# Patient Record
Sex: FEMALE | Race: ASIAN | Employment: UNEMPLOYED | URBAN - METROPOLITAN AREA
[De-identification: names, ages, dates, MRNs, and addresses within clinical notes are randomized per-mention and may not be internally consistent; named-entity substitution may affect disease eponyms.]

---

## 2018-01-11 ENCOUNTER — HOSPITAL ENCOUNTER (OUTPATIENT)
Dept: MAMMOGRAPHY | Age: 58
Discharge: HOME | End: 2018-01-11
Attending: FAMILY MEDICINE
Payer: OTHER GOVERNMENT

## 2018-01-11 DIAGNOSIS — Z12.39 SCREENING FOR MALIGNANT NEOPLASM OF BREAST: ICD-10-CM

## 2018-01-11 PROCEDURE — 77067 SCR MAMMO BI INCL CAD: CPT

## 2019-05-21 RX ORDER — MIDAZOLAM HYDROCHLORIDE 1 MG/ML
1-5 INJECTION, SOLUTION INTRAMUSCULAR; INTRAVENOUS
Status: CANCELLED | OUTPATIENT
Start: 2019-05-21

## 2019-05-21 RX ORDER — FENTANYL CITRATE 50 UG/ML
50 INJECTION, SOLUTION INTRAMUSCULAR; INTRAVENOUS
Status: CANCELLED | OUTPATIENT
Start: 2019-05-21

## 2019-06-04 ENCOUNTER — HOSPITAL ENCOUNTER (OUTPATIENT)
Age: 59
Setting detail: OUTPATIENT SURGERY
Discharge: HOME | End: 2019-06-04
Attending: SURGERY | Admitting: SURGERY
Payer: OTHER GOVERNMENT

## 2019-06-04 VITALS
SYSTOLIC BLOOD PRESSURE: 130 MMHG | BODY MASS INDEX: 28.02 KG/M2 | HEART RATE: 67 BPM | OXYGEN SATURATION: 94 % | RESPIRATION RATE: 16 BRPM | DIASTOLIC BLOOD PRESSURE: 87 MMHG | TEMPERATURE: 98.2 F | WEIGHT: 139 LBS | HEIGHT: 59 IN

## 2019-06-04 PROCEDURE — 76040000026: Performed by: SURGERY

## 2019-06-04 PROCEDURE — 88305 TISSUE EXAM BY PATHOLOGIST: CPT

## 2019-06-04 PROCEDURE — G0500 MOD SEDAT ENDO SERVICE >5YRS: HCPCS | Performed by: SURGERY

## 2019-06-04 PROCEDURE — 77030021593 HC FCPS BIOP ENDOSC BSC -A: Performed by: SURGERY

## 2019-06-04 PROCEDURE — 74011250636 HC RX REV CODE- 250/636: Performed by: SURGERY

## 2019-06-04 PROCEDURE — 74011250636 HC RX REV CODE- 250/636

## 2019-06-04 PROCEDURE — 99153 MOD SED SAME PHYS/QHP EA: CPT | Performed by: SURGERY

## 2019-06-04 RX ORDER — FENTANYL CITRATE 50 UG/ML
50 INJECTION, SOLUTION INTRAMUSCULAR; INTRAVENOUS
Status: DISCONTINUED | OUTPATIENT
Start: 2019-06-04 | End: 2019-06-04 | Stop reason: HOSPADM

## 2019-06-04 RX ORDER — MIDAZOLAM HYDROCHLORIDE 1 MG/ML
1-5 INJECTION, SOLUTION INTRAMUSCULAR; INTRAVENOUS
Status: DISCONTINUED | OUTPATIENT
Start: 2019-06-04 | End: 2019-06-04 | Stop reason: HOSPADM

## 2019-06-04 RX ORDER — SODIUM CHLORIDE 9 MG/ML
1000 INJECTION, SOLUTION INTRAVENOUS CONTINUOUS
Status: DISCONTINUED | OUTPATIENT
Start: 2019-06-04 | End: 2019-06-04 | Stop reason: HOSPADM

## 2019-06-04 RX ADMIN — FENTANYL CITRATE 50 MCG: 50 INJECTION, SOLUTION INTRAMUSCULAR; INTRAVENOUS at 09:23

## 2019-06-04 RX ADMIN — FENTANYL CITRATE 50 MCG: 50 INJECTION, SOLUTION INTRAMUSCULAR; INTRAVENOUS at 09:36

## 2019-06-04 RX ADMIN — SODIUM CHLORIDE 1000 ML: 900 INJECTION, SOLUTION INTRAVENOUS at 08:08

## 2019-06-04 RX ADMIN — MIDAZOLAM HYDROCHLORIDE 2 MG: 1 INJECTION, SOLUTION INTRAMUSCULAR; INTRAVENOUS at 09:23

## 2019-06-04 RX ADMIN — MIDAZOLAM HYDROCHLORIDE 1 MG: 1 INJECTION, SOLUTION INTRAMUSCULAR; INTRAVENOUS at 09:36

## 2019-06-04 NOTE — OP NOTES
[address] [facility]  OPERATIVE REPORT    Name:  Roseanne Bates  MR#:  [id number]  :  1960  ACCOUNT #:  [de-identified]  DATE OF SERVICE:  2019    PREOPERATIVE DIAGNOSIS:  History of colon polyp with rectal bleeding. POSTOPERATIVE DIAGNOSES:  Colon polyp and very extensive internal and external hemorrhoids and moderate diverticulosis. PROCEDURES PERFORMED:  Colonoscopy with polypectomy with cold forceps. SURGEON:  Arden Flowers MD    ANESTHESIA:  Conscious sedation    COMPLICATIONS: none    SPECIMENS REMOVED:  polyp    IMPLANTS:  none    ESTIMATED BLOOD LOSS:  none    INDICATION:  This is a 61-year-old female who presented with a long history of rectal bleeding. She also reports a history of hemorrhoids and then repeat colonoscopy was required. Patient understood risks and benefits and agreed to proceed. FINDINGS:  1. She does have very extensive circumferential internal and external hemorrhoids and there is one raw area appeared to be responsible for bleeding. 2.  She has one 3 mm polyp at 35 cm from anal verge. This was removed with cold forceps. 3.  She has moderate diverticulosis and she has very redundant colon with moderate liquidy stool which could hide more small polyps. PROCEDURE:  After informed consent was obtained, patient was brought into the GI suite. Conscious sedation with fentanyl and Versed were used and then patient was left on left decubitus position. The hemorrhoids were easily visible from outside. There was also clearly a bleeding spot from the hemorrhoid site. Then, the regular colonoscope was used and this was advanced carefully under direct vision. She does have very redundant colon. With careful manipulations and repositioning, scope was able to intubate the cecum. Ileocecal valve and appendiceal orifice were clearly identified.   The scope was then carefully withdrawn and the right transverse descending colon looks very unremarkable and a small polyp in the sigmoid colon was removed completely with biopsy forceps. Then, the scope was further withdrawn and there were no other obvious bigger polyp or tumor, although her bowel prep was not perfect. Then, the scope was withdrawn. Patient tolerated the procedure well and transferred to recovery room in stable condition. RECOMMENDATION:  I will see her in two to three weeks to discuss the biopsy results and also she should have hemorrhoid surgery given her ongoing bleeding.       Nora Cabral MD      [email or web address]  D:  06/04/2019 9:59  T:  06/04/2019 11:29  JOB #:  [id number]

## 2019-06-04 NOTE — DISCHARGE INSTRUCTIONS
Gastrointestinal Colonoscopy/Flexible Sigmoidoscopy - Lower Exam Discharge Instructions  1. Call Dr. Racquel Raya for any problems or questions. 2. Contact the doctors office for follow up appointment as directed  3. Medication may cause drowsiness for several hours, therefore:  · Do not drive or operate machinery for reminder of the day. · No alcohol today. · Do not make any important or legal decisions for 24 hours. · Do not sign any legal documents for 24 hours. 4. Ordinarily, you may resume regular diet and activity after exam unless otherwise specified by your physician. 5. Because of air put into your colon during exam, you may experience some abdominal distension, relieved by the passage of gas, for several hours. 6. Contact your physician if you have any of the following:  · Excessive amount of bleeding - large amount when having a bowel movement. Occasional specks of blood with bowel movement would not be unusual.  · Severe abdominal pain  · Fever or Chills  7. Polyp Removal - follow these additional instructions  · No Aspirin, Advil, Aleve, Nuprin, Ibuprofen, or medications that contain these drugs for 2 weeks. Any additional instructions:  Dr. Rema Mckinney office will contact you regarding the results of the pathology. Instructions given to Cheo Perez and other family members.

## 2019-06-04 NOTE — H&P
[facility]  [address] [place] [address]  [phone]      Patient:  Jose Hernandez  : 1960     [facility]  Jose Hernandez is a 63 y.o. female Patient presents to the office for colonoscopy. Patient states she had a colonoscopy 5 years ago at [facility] and was told she had 3 polyps and had to go back to completely excised. She was told to repeat scope in 3 years. . Patient states bowel habits are good, denies diarrhea or constipation. She does states she has rectal bleeding on and off and has since last colonoscopy. Bright red in color with and sometimes without BM. States she does have history of hemorrhoids. Good appetite, no unintentional weight loss noted. Denies N/V. Denies CP or SOB. does not have a history of smoking. does not have a history of drinking alcohol.     does not have a family history of colon cancer. Medical history- HTN. Abdominal surgeries-None  does not take blood thinners                Past Medical History:   Diagnosis Date    Cancer ([facility] [address]       rectal carcinoid    Hypertension               Current Outpatient Medications   Medication Sig Dispense Refill    losartan-hydroCHLOROthiazide (HYZAAR) 50-12.5 mg per tablet TAKE 1 TABLET DAILY FOR HYPERTENSION 90 Tab 3    verapamil ER (VERELAN) 240 mg ER capsule TAKE 1 CAPSULE DAILY FOR HYPERTENSION 90 Cap 3           Allergies   Allergen Reactions    Aspirin Rash            Past Surgical History:   Procedure Laterality Date    HX COLONOSCOPY                Family History   Problem Relation Age of Onset    Other Father           Cardiac Arrest    Other Mother           Heart Problems    Breast Cancer Neg Hx        Social History           Tobacco Use    Smoking status: Never Smoker    Smokeless tobacco: Never Used   Substance Use Topics    Alcohol use: No         Review of Systems   A comprehensive review of systems was negative except for that written in the HPI.      Physical Exam  Visit Vitals  /80   Pulse 90   Ht 4' 11\" (1.499 m)   Wt 139 lb (63 kg)   SpO2 97%   BMI 28.07 kg/m²         General:          Alert, oriented, cooperative, awake patient in no acute distress   Skin:                Warm, moist with good texture   Eyes:               Sclera are clear, extraocular muscles intact  HENT:             Normocephalic; oral mucosa moist, nares patent; neck is supple; trachea midline  Respiratory:     Lungs clear to auscultation bilaterally, breathing is non-labored   Chest:              Symmetric throughout one respiratory excursion; no supraclavicular lymphadenopathy  CV:                  Regular rate and rhythm, no appreciable murmurs, rubs, gallops  Abdomen:        Soft, protuberant but non-distended; bowel sounds are normoactive   Extremities:     No cyanosis, clubbing or edema  Neurological:   No focal signs        Labs:         Lab Results   Component Value Date/Time     WBC (POC) 7.7 04/03/2019 11:00 AM     HGB (POC) 13.8 04/03/2019 11:00 AM     HCT (POC) 40.8 04/03/2019 11:00 AM     PLATELET (POC) 593 07/92/2173 11:00 AM     MCV (POC) 93.1 04/03/2019 11:00 AM         Assessment/Plan:   Juli Bravo is a 63 y.o. female who has signs and symptoms consistent with need for colonoscopy. She had previous one 5 years ago with polyps and was told to return in 3 years. She is here for her repeat colonoscopy. Patient verbalized understanding of importance for bowel prep and understands she will need a ride after procedure related to sedation.     1. Schedule colonoscopy     Jodie Mohan NP     I have seen and examined the patient with the Nurse Practitioner and agree with the above assessment and plan.      History of colon polyps, chronic bright red rectal bleeding with history of hemorrhoids.  Need colonoscopy.   Magda Pal MD

## 2019-06-04 NOTE — ROUTINE PROCESS
Pt. Discharged to car by Ana Barun with family. Vital signs stable. Able to tolerate PO fluids. Passing gas.  Seen by MD.

## 2019-08-23 PROBLEM — K64.1 GRADE II HEMORRHOIDS: Status: ACTIVE | Noted: 2019-08-23

## 2019-09-06 RX ORDER — SODIUM CHLORIDE 0.9 % (FLUSH) 0.9 %
5-40 SYRINGE (ML) INJECTION AS NEEDED
Status: CANCELLED | OUTPATIENT
Start: 2019-09-06

## 2019-09-06 RX ORDER — SODIUM CHLORIDE 0.9 % (FLUSH) 0.9 %
5-40 SYRINGE (ML) INJECTION EVERY 8 HOURS
Status: CANCELLED | OUTPATIENT
Start: 2019-09-06

## 2019-09-12 ENCOUNTER — HOSPITAL ENCOUNTER (OUTPATIENT)
Dept: SURGERY | Age: 59
Discharge: HOME | End: 2019-09-12
Payer: OTHER GOVERNMENT

## 2019-09-12 VITALS
HEART RATE: 66 BPM | RESPIRATION RATE: 18 BRPM | OXYGEN SATURATION: 97 % | BODY MASS INDEX: 26.14 KG/M2 | HEIGHT: 61 IN | DIASTOLIC BLOOD PRESSURE: 84 MMHG | WEIGHT: 138.44 LBS | TEMPERATURE: 98.1 F | SYSTOLIC BLOOD PRESSURE: 173 MMHG

## 2019-09-12 LAB
ALBUMIN SERPL-MCNC: 4.4 G/DL (ref 3.5–5)
ALBUMIN/GLOB SERPL: 1 {RATIO} (ref 1.2–3.5)
ALP SERPL-CCNC: 80 U/L (ref 50–136)
ALT SERPL-CCNC: 27 U/L (ref 12–65)
ANION GAP SERPL CALC-SCNC: 5 MMOL/L (ref 7–16)
AST SERPL-CCNC: 15 U/L (ref 15–37)
BASOPHILS # BLD: 0 K/UL (ref 0–0.2)
BASOPHILS NFR BLD: 0 % (ref 0–2)
BILIRUB SERPL-MCNC: 0.6 MG/DL (ref 0.2–1.1)
BUN SERPL-MCNC: 7 MG/DL (ref 6–23)
CALCIUM SERPL-MCNC: 9.3 MG/DL (ref 8.3–10.4)
CHLORIDE SERPL-SCNC: 106 MMOL/L (ref 98–107)
CO2 SERPL-SCNC: 31 MMOL/L (ref 21–32)
CREAT SERPL-MCNC: 0.62 MG/DL (ref 0.6–1)
DIFFERENTIAL METHOD BLD: NORMAL
EOSINOPHIL # BLD: 0.3 K/UL (ref 0–0.8)
EOSINOPHIL NFR BLD: 4 % (ref 0.5–7.8)
ERYTHROCYTE [DISTWIDTH] IN BLOOD BY AUTOMATED COUNT: 12.2 % (ref 11.9–14.6)
GLOBULIN SER CALC-MCNC: 4.5 G/DL (ref 2.3–3.5)
GLUCOSE SERPL-MCNC: 104 MG/DL (ref 65–100)
HCT VFR BLD AUTO: 41.9 % (ref 35.8–46.3)
HGB BLD-MCNC: 14.5 G/DL (ref 11.7–15.4)
IMM GRANULOCYTES # BLD AUTO: 0 K/UL (ref 0–0.5)
IMM GRANULOCYTES NFR BLD AUTO: 0 % (ref 0–5)
INR PPP: 1
LYMPHOCYTES # BLD: 2.2 K/UL (ref 0.5–4.6)
LYMPHOCYTES NFR BLD: 31 % (ref 13–44)
MCH RBC QN AUTO: 31.5 PG (ref 26.1–32.9)
MCHC RBC AUTO-ENTMCNC: 34.6 G/DL (ref 31.4–35)
MCV RBC AUTO: 91.1 FL (ref 79.6–97.8)
MONOCYTES # BLD: 0.4 K/UL (ref 0.1–1.3)
MONOCYTES NFR BLD: 6 % (ref 4–12)
NEUTS SEG # BLD: 4 K/UL (ref 1.7–8.2)
NEUTS SEG NFR BLD: 58 % (ref 43–78)
NRBC # BLD: 0 K/UL (ref 0–0.2)
PLATELET # BLD AUTO: 286 K/UL (ref 150–450)
PMV BLD AUTO: 9.8 FL (ref 9.4–12.3)
POTASSIUM SERPL-SCNC: 3.2 MMOL/L (ref 3.5–5.1)
PROT SERPL-MCNC: 8.9 G/DL (ref 6.3–8.2)
PROTHROMBIN TIME: 13.7 SEC (ref 11.7–14.5)
RBC # BLD AUTO: 4.6 M/UL (ref 4.05–5.2)
SODIUM SERPL-SCNC: 142 MMOL/L (ref 136–145)
WBC # BLD AUTO: 6.9 K/UL (ref 4.3–11.1)

## 2019-09-12 PROCEDURE — 85610 PROTHROMBIN TIME: CPT

## 2019-09-12 PROCEDURE — 85025 COMPLETE CBC W/AUTO DIFF WBC: CPT

## 2019-09-12 PROCEDURE — 80053 COMPREHEN METABOLIC PANEL: CPT

## 2019-09-12 RX ORDER — VERAPAMIL HYDROCHLORIDE 240 MG/1
240 CAPSULE, EXTENDED RELEASE ORAL
COMMUNITY
End: 2019-10-31 | Stop reason: SDUPTHER

## 2019-09-12 NOTE — PERIOP NOTES
Lab resulted and routed to surgeon's office. Results wnl of anesthesia protocol. Recent Results (from the past 12 hour(s))   CBC WITH AUTOMATED DIFF    Collection Time: 09/12/19 10:50 AM   Result Value Ref Range    WBC 6.9 4.3 - 11.1 K/uL    RBC 4.60 4.05 - 5.2 M/uL    HGB 14.5 11.7 - 15.4 g/dL    HCT 41.9 35.8 - 46.3 %    MCV 91.1 79.6 - 97.8 FL    MCH 31.5 26.1 - 32.9 PG    MCHC 34.6 31.4 - 35.0 g/dL    RDW 12.2 11.9 - 14.6 %    PLATELET 620 884 - 691 K/uL    MPV 9.8 9.4 - 12.3 FL    ABSOLUTE NRBC 0.00 0.0 - 0.2 K/uL    DF AUTOMATED      NEUTROPHILS 58 43 - 78 %    LYMPHOCYTES 31 13 - 44 %    MONOCYTES 6 4.0 - 12.0 %    EOSINOPHILS 4 0.5 - 7.8 %    BASOPHILS 0 0.0 - 2.0 %    IMMATURE GRANULOCYTES 0 0.0 - 5.0 %    ABS. NEUTROPHILS 4.0 1.7 - 8.2 K/UL    ABS. LYMPHOCYTES 2.2 0.5 - 4.6 K/UL    ABS. MONOCYTES 0.4 0.1 - 1.3 K/UL    ABS. EOSINOPHILS 0.3 0.0 - 0.8 K/UL    ABS. BASOPHILS 0.0 0.0 - 0.2 K/UL    ABS. IMM. GRANS. 0.0 0.0 - 0.5 K/UL   METABOLIC PANEL, COMPREHENSIVE    Collection Time: 09/12/19 10:50 AM   Result Value Ref Range    Sodium 142 136 - 145 mmol/L    Potassium 3.2 (L) 3.5 - 5.1 mmol/L    Chloride 106 98 - 107 mmol/L    CO2 31 21 - 32 mmol/L    Anion gap 5 (L) 7 - 16 mmol/L    Glucose 104 (H) 65 - 100 mg/dL    BUN 7 6 - 23 MG/DL    Creatinine 0.62 0.6 - 1.0 MG/DL    GFR est AA >60 >60 ml/min/1.73m2    GFR est non-AA >60 >60 ml/min/1.73m2    Calcium 9.3 8.3 - 10.4 MG/DL    Bilirubin, total 0.6 0.2 - 1.1 MG/DL    ALT (SGPT) 27 12 - 65 U/L    AST (SGOT) 15 15 - 37 U/L    Alk.  phosphatase 80 50 - 136 U/L    Protein, total 8.9 (H) 6.3 - 8.2 g/dL    Albumin 4.4 3.5 - 5.0 g/dL    Globulin 4.5 (H) 2.3 - 3.5 g/dL    A-G Ratio 1.0 (L) 1.2 - 3.5     PROTHROMBIN TIME + INR    Collection Time: 09/12/19 10:50 AM   Result Value Ref Range    Prothrombin time 13.7 11.7 - 14.5 sec    INR 1.0

## 2019-09-12 NOTE — PERIOP NOTES
Patient verified name and     Order for consent was found in EHR and matches case posting; patient verified. Type 1B surgery, walk in assessment complete. Labs per surgeon: cbc,cmp, pt collected and results in [facility];   Labs per anesthesia protocol:no additional  EKG: EKG is not required per protocol. Patient denies any chest pain or shortness of breath on exertion    Hospital approved surgical skin cleanser and instructions given per hospital policy. Patient provided with and instructed on educational handouts including Guide to Surgery, Pain Management, Hand Hygiene, Blood Transfusion Education, and [facility] Brochure. Patient answered medical/surgical history questions at their best of ability. All prior to admission medications documented in [facility]. Original medication prescription bottle were not visualized during patient appointment. Patient instructed to hold all vitamins 7 days prior to surgery and NSAIDS 5 days prior to surgery, patient verbalized understanding. Prescription medications to hold: Losartan/ HCTZ day of surgery    Patient instructed to continue previous medications as prescribed prior to surgery and to take the following medications the day of surgery according to anesthesia guidelines with a small sip of water: none. Patient teach back successful and patient demonstrates knowledge of instructions.

## 2019-09-19 ENCOUNTER — ANESTHESIA (OUTPATIENT)
Dept: SURGERY | Age: 59
End: 2019-09-19
Payer: OTHER GOVERNMENT

## 2019-09-19 ENCOUNTER — HOSPITAL ENCOUNTER (OUTPATIENT)
Age: 59
Setting detail: OUTPATIENT SURGERY
Discharge: HOME | End: 2019-09-19
Attending: SURGERY | Admitting: SURGERY
Payer: OTHER GOVERNMENT

## 2019-09-19 ENCOUNTER — ANESTHESIA EVENT (OUTPATIENT)
Dept: SURGERY | Age: 59
End: 2019-09-19
Payer: OTHER GOVERNMENT

## 2019-09-19 VITALS
DIASTOLIC BLOOD PRESSURE: 81 MMHG | RESPIRATION RATE: 16 BRPM | HEART RATE: 79 BPM | BODY MASS INDEX: 25.79 KG/M2 | SYSTOLIC BLOOD PRESSURE: 146 MMHG | OXYGEN SATURATION: 94 % | TEMPERATURE: 97.9 F | WEIGHT: 136.5 LBS

## 2019-09-19 DIAGNOSIS — K64.1 GRADE II HEMORRHOIDS: Primary | ICD-10-CM

## 2019-09-19 LAB — POTASSIUM BLD-SCNC: 3.1 MMOL/L (ref 3.5–5.1)

## 2019-09-19 PROCEDURE — 74011250636 HC RX REV CODE- 250/636: Performed by: ANESTHESIOLOGY

## 2019-09-19 PROCEDURE — 77030037088 HC TUBE ENDOTRACH ORAL NSL COVD-A: Performed by: ANESTHESIOLOGY

## 2019-09-19 PROCEDURE — 74011250636 HC RX REV CODE- 250/636

## 2019-09-19 PROCEDURE — 77030002888 HC SUT CHRMC J&J -A: Performed by: SURGERY

## 2019-09-19 PROCEDURE — 74011250636 HC RX REV CODE- 250/636: Performed by: SURGERY

## 2019-09-19 PROCEDURE — 77030040361 HC SLV COMPR DVT MDII -B: Performed by: SURGERY

## 2019-09-19 PROCEDURE — 74011000250 HC RX REV CODE- 250

## 2019-09-19 PROCEDURE — 84132 ASSAY OF SERUM POTASSIUM: CPT

## 2019-09-19 PROCEDURE — 76210000006 HC OR PH I REC 0.5 TO 1 HR: Performed by: SURGERY

## 2019-09-19 PROCEDURE — 74011250637 HC RX REV CODE- 250/637: Performed by: ANESTHESIOLOGY

## 2019-09-19 PROCEDURE — 88304 TISSUE EXAM BY PATHOLOGIST: CPT

## 2019-09-19 PROCEDURE — 76060000033 HC ANESTHESIA 1 TO 1.5 HR: Performed by: SURGERY

## 2019-09-19 PROCEDURE — 77030034626 HC LIGASURE SM JAW SEAL OPN SURG COVD -E: Performed by: SURGERY

## 2019-09-19 PROCEDURE — 77030018836 HC SOL IRR NACL ICUM -A: Performed by: SURGERY

## 2019-09-19 PROCEDURE — 76210000020 HC REC RM PH II FIRST 0.5 HR: Performed by: SURGERY

## 2019-09-19 PROCEDURE — 77030019940 HC BLNKT HYPOTHRM STRY -B: Performed by: ANESTHESIOLOGY

## 2019-09-19 PROCEDURE — 76010000149 HC OR TIME 1 TO 1.5 HR: Performed by: SURGERY

## 2019-09-19 PROCEDURE — 77030039425 HC BLD LARYNG TRULITE DISP TELE -A: Performed by: ANESTHESIOLOGY

## 2019-09-19 PROCEDURE — 74011000250 HC RX REV CODE- 250: Performed by: SURGERY

## 2019-09-19 RX ORDER — LIDOCAINE HYDROCHLORIDE 20 MG/ML
INJECTION, SOLUTION EPIDURAL; INFILTRATION; INTRACAUDAL; PERINEURAL AS NEEDED
Status: DISCONTINUED | OUTPATIENT
Start: 2019-09-19 | End: 2019-09-19 | Stop reason: HOSPADM

## 2019-09-19 RX ORDER — LIDOCAINE 50 MG/G
OINTMENT TOPICAL AS NEEDED
Status: DISCONTINUED | OUTPATIENT
Start: 2019-09-19 | End: 2019-09-19 | Stop reason: HOSPADM

## 2019-09-19 RX ORDER — ADHESIVE BANDAGE
30 BANDAGE TOPICAL
Qty: 1 BOTTLE | Refills: 0 | Status: SHIPPED | OUTPATIENT
Start: 2019-09-19 | End: 2021-03-05

## 2019-09-19 RX ORDER — NEOSTIGMINE METHYLSULFATE 1 MG/ML
INJECTION INTRAVENOUS AS NEEDED
Status: DISCONTINUED | OUTPATIENT
Start: 2019-09-19 | End: 2019-09-19 | Stop reason: HOSPADM

## 2019-09-19 RX ORDER — HYDROMORPHONE HYDROCHLORIDE 2 MG/ML
0.5 INJECTION, SOLUTION INTRAMUSCULAR; INTRAVENOUS; SUBCUTANEOUS
Status: DISCONTINUED | OUTPATIENT
Start: 2019-09-19 | End: 2019-09-19 | Stop reason: HOSPADM

## 2019-09-19 RX ORDER — ESMOLOL HYDROCHLORIDE 10 MG/ML
INJECTION INTRAVENOUS AS NEEDED
Status: DISCONTINUED | OUTPATIENT
Start: 2019-09-19 | End: 2019-09-19 | Stop reason: HOSPADM

## 2019-09-19 RX ORDER — ONDANSETRON 2 MG/ML
INJECTION INTRAMUSCULAR; INTRAVENOUS AS NEEDED
Status: DISCONTINUED | OUTPATIENT
Start: 2019-09-19 | End: 2019-09-19 | Stop reason: HOSPADM

## 2019-09-19 RX ORDER — BUPIVACAINE HYDROCHLORIDE AND EPINEPHRINE 5; 5 MG/ML; UG/ML
INJECTION, SOLUTION EPIDURAL; INTRACAUDAL; PERINEURAL AS NEEDED
Status: DISCONTINUED | OUTPATIENT
Start: 2019-09-19 | End: 2019-09-19 | Stop reason: HOSPADM

## 2019-09-19 RX ORDER — MIDAZOLAM HYDROCHLORIDE 1 MG/ML
2 INJECTION, SOLUTION INTRAMUSCULAR; INTRAVENOUS
Status: DISCONTINUED | OUTPATIENT
Start: 2019-09-19 | End: 2019-09-19 | Stop reason: HOSPADM

## 2019-09-19 RX ORDER — SODIUM CHLORIDE, SODIUM LACTATE, POTASSIUM CHLORIDE, CALCIUM CHLORIDE 600; 310; 30; 20 MG/100ML; MG/100ML; MG/100ML; MG/100ML
125 INJECTION, SOLUTION INTRAVENOUS CONTINUOUS
Status: DISCONTINUED | OUTPATIENT
Start: 2019-09-19 | End: 2019-09-19 | Stop reason: HOSPADM

## 2019-09-19 RX ORDER — NALOXONE HYDROCHLORIDE 0.4 MG/ML
0.1 INJECTION, SOLUTION INTRAMUSCULAR; INTRAVENOUS; SUBCUTANEOUS AS NEEDED
Status: DISCONTINUED | OUTPATIENT
Start: 2019-09-19 | End: 2019-09-19 | Stop reason: HOSPADM

## 2019-09-19 RX ORDER — METRONIDAZOLE 500 MG/100ML
500 INJECTION, SOLUTION INTRAVENOUS ONCE
Status: COMPLETED | OUTPATIENT
Start: 2019-09-19 | End: 2019-09-19

## 2019-09-19 RX ORDER — OXYCODONE AND ACETAMINOPHEN 7.5; 325 MG/1; MG/1
1 TABLET ORAL
Qty: 30 TAB | Refills: 0 | Status: SHIPPED | OUTPATIENT
Start: 2019-09-19 | End: 2019-09-24

## 2019-09-19 RX ORDER — SODIUM CHLORIDE 0.9 % (FLUSH) 0.9 %
5-40 SYRINGE (ML) INJECTION EVERY 8 HOURS
Status: DISCONTINUED | OUTPATIENT
Start: 2019-09-19 | End: 2019-09-19 | Stop reason: HOSPADM

## 2019-09-19 RX ORDER — GLYCOPYRROLATE 0.2 MG/ML
INJECTION INTRAMUSCULAR; INTRAVENOUS AS NEEDED
Status: DISCONTINUED | OUTPATIENT
Start: 2019-09-19 | End: 2019-09-19 | Stop reason: HOSPADM

## 2019-09-19 RX ORDER — ACETAMINOPHEN 500 MG
500 TABLET ORAL
Status: COMPLETED | OUTPATIENT
Start: 2019-09-19 | End: 2019-09-19

## 2019-09-19 RX ORDER — DOCUSATE SODIUM 100 MG/1
100 CAPSULE, LIQUID FILLED ORAL 2 TIMES DAILY
Qty: 60 CAP | Refills: 0 | Status: SHIPPED | OUTPATIENT
Start: 2019-09-19 | End: 2019-10-19

## 2019-09-19 RX ORDER — SODIUM CHLORIDE 0.9 % (FLUSH) 0.9 %
5-40 SYRINGE (ML) INJECTION AS NEEDED
Status: DISCONTINUED | OUTPATIENT
Start: 2019-09-19 | End: 2019-09-19 | Stop reason: HOSPADM

## 2019-09-19 RX ORDER — OXYCODONE HYDROCHLORIDE 5 MG/1
10 TABLET ORAL
Status: COMPLETED | OUTPATIENT
Start: 2019-09-19 | End: 2019-09-19

## 2019-09-19 RX ORDER — CEFAZOLIN SODIUM/WATER 2 G/20 ML
2 SYRINGE (ML) INTRAVENOUS ONCE
Status: COMPLETED | OUTPATIENT
Start: 2019-09-19 | End: 2019-09-19

## 2019-09-19 RX ORDER — EPHEDRINE SULFATE 50 MG/ML
INJECTION, SOLUTION INTRAVENOUS AS NEEDED
Status: DISCONTINUED | OUTPATIENT
Start: 2019-09-19 | End: 2019-09-19 | Stop reason: HOSPADM

## 2019-09-19 RX ORDER — ROCURONIUM BROMIDE 10 MG/ML
INJECTION, SOLUTION INTRAVENOUS AS NEEDED
Status: DISCONTINUED | OUTPATIENT
Start: 2019-09-19 | End: 2019-09-19 | Stop reason: HOSPADM

## 2019-09-19 RX ORDER — ACETAMINOPHEN 500 MG
1000 TABLET ORAL ONCE
Status: COMPLETED | OUTPATIENT
Start: 2019-09-19 | End: 2019-09-19

## 2019-09-19 RX ORDER — PROPOFOL 10 MG/ML
INJECTION, EMULSION INTRAVENOUS AS NEEDED
Status: DISCONTINUED | OUTPATIENT
Start: 2019-09-19 | End: 2019-09-19 | Stop reason: HOSPADM

## 2019-09-19 RX ORDER — LIDOCAINE HYDROCHLORIDE 10 MG/ML
0.1 INJECTION INFILTRATION; PERINEURAL AS NEEDED
Status: DISCONTINUED | OUTPATIENT
Start: 2019-09-19 | End: 2019-09-19 | Stop reason: HOSPADM

## 2019-09-19 RX ORDER — FENTANYL CITRATE 50 UG/ML
INJECTION, SOLUTION INTRAMUSCULAR; INTRAVENOUS AS NEEDED
Status: DISCONTINUED | OUTPATIENT
Start: 2019-09-19 | End: 2019-09-19 | Stop reason: HOSPADM

## 2019-09-19 RX ADMIN — METRONIDAZOLE 500 MG: 500 INJECTION, SOLUTION INTRAVENOUS at 05:58

## 2019-09-19 RX ADMIN — PROPOFOL 180 MG: 10 INJECTION, EMULSION INTRAVENOUS at 07:22

## 2019-09-19 RX ADMIN — ONDANSETRON 4 MG: 2 INJECTION INTRAMUSCULAR; INTRAVENOUS at 08:17

## 2019-09-19 RX ADMIN — NEOSTIGMINE METHYLSULFATE 4.5 MG: 1 INJECTION INTRAVENOUS at 08:20

## 2019-09-19 RX ADMIN — SODIUM CHLORIDE, SODIUM LACTATE, POTASSIUM CHLORIDE, AND CALCIUM CHLORIDE: 600; 310; 30; 20 INJECTION, SOLUTION INTRAVENOUS at 07:17

## 2019-09-19 RX ADMIN — ACETAMINOPHEN 500 MG: 500 TABLET, FILM COATED ORAL at 06:05

## 2019-09-19 RX ADMIN — FENTANYL CITRATE 100 MCG: 50 INJECTION, SOLUTION INTRAMUSCULAR; INTRAVENOUS at 07:22

## 2019-09-19 RX ADMIN — SODIUM CHLORIDE, SODIUM LACTATE, POTASSIUM CHLORIDE, AND CALCIUM CHLORIDE 125 ML/HR: 600; 310; 30; 20 INJECTION, SOLUTION INTRAVENOUS at 05:58

## 2019-09-19 RX ADMIN — GLYCOPYRROLATE 0.7 MG: 0.2 INJECTION INTRAMUSCULAR; INTRAVENOUS at 08:20

## 2019-09-19 RX ADMIN — ESMOLOL HYDROCHLORIDE 20 MG: 10 INJECTION INTRAVENOUS at 08:25

## 2019-09-19 RX ADMIN — ACETAMINOPHEN 500 MG: 500 TABLET, FILM COATED ORAL at 05:58

## 2019-09-19 RX ADMIN — ROCURONIUM BROMIDE 30 MG: 10 INJECTION, SOLUTION INTRAVENOUS at 07:22

## 2019-09-19 RX ADMIN — Medication 2 G: at 07:33

## 2019-09-19 RX ADMIN — OXYCODONE HYDROCHLORIDE 10 MG: 5 TABLET ORAL at 10:20

## 2019-09-19 RX ADMIN — LIDOCAINE HYDROCHLORIDE 60 MG: 20 INJECTION, SOLUTION EPIDURAL; INFILTRATION; INTRACAUDAL; PERINEURAL at 07:22

## 2019-09-19 RX ADMIN — EPHEDRINE SULFATE 10 MG: 50 INJECTION, SOLUTION INTRAVENOUS at 07:39

## 2019-09-19 NOTE — DISCHARGE INSTRUCTIONS
Remove rectal packing for first bowel movement or after 24 hours    May have trouble to urinate tonight, try to urinate in warm tub, may need to go to emergency room to place Hendricks catheter. Warm tub or sitz bath twice a day, AND after every bowel movement. Avoid constipation, need daily soft bowel movement. Take colace, metamucil everyday as prescribed, take milk of magnesium as needed. Call if no bowel movement after 48 hours        Hemorrhoidectomy: What to Expect at [address] [place] [address]    After you have hemorrhoids removed, you can expect to feel better each day. Your anal area will be painful or ache for 2 to 4 weeks. And you may need pain medicine. It is common to have some light bleeding and clear or yellow fluids from your anus. This is most likely when you have a bowel movement. These symptoms may last for 1 to 2 months after surgery. After 1 to 2 weeks, you should be able to do most of your normal activities. But don't do things that require a lot of effort. It is important to avoid heavy lifting and straining with bowel movements while you recover. This care sheet gives you a general idea about how long it will take for you to recover. But each person recovers at a different pace. Follow the steps below to get better as quickly as possible. How can you care for yourself at home? Activity    · Rest when you feel tired.     · Be active. Walking is a good choice.     · Allow your body to heal. Don't move quickly or lift anything heavy until you are feeling better.     · You may take showers and baths as usual. Pat your anal area dry when you are done.     · You will probably need to take 1 to 2 weeks off work. It depends on the type of work you do and how you feel. Diet    · Follow your doctor's instructions about eating after surgery.     · Start adding high-fiber foods to your diet 2 or 3 days after your surgery. This will make bowel movements easier.  And it lowers the chance that you will get hemorrhoids again.     · If your bowel movements are not regular right after surgery, try to avoid constipation and straining. Drink plenty of water. Your doctor may suggest fiber, a stool softener, or a mild laxative.    Medications    · Your doctor will tell you if and when you can restart your medicines. He or she will also give you instructions about taking any new medicines.     · If you take blood thinners, such as warfarin (Coumadin), clopidogrel (Plavix), or aspirin, be sure to talk to your doctor. He or she will tell you if and when to start taking those medicines again. Make sure that you understand exactly what your doctor wants you to do.     · Be safe with medicines. Read and follow all instructions on the label. ? If the doctor gave you a prescription medicine for pain, take it as prescribed. ? If you are not taking a prescription pain medicine, ask your doctor if you can take an over-the-counter medicine.     · If your doctor prescribed antibiotics, take them as directed. Do not stop taking them just because you feel better. You need to take the full course of antibiotics.     · You may apply numbing medicines before and after bowel movements to relieve pain. Other instructions    · Sit in a few inches of warm water (sitz bath) for 15 to 20 minutes 3 times a day and after bowel movements. Then pat the area dry. Do this as long as you have pain in your anal area.     · Avoid sitting on the toilet for long periods of time or straining during bowel movements.     · Keep your anal area clean.     · Support your feet with a small step stool when you sit on the toilet. This helps flex your hips and places your pelvis in a squatting position. This can make bowel movements easier after surgery.     · Use baby wipes or medicated pads, such as Tucks, instead of toilet paper after a bowel movement.  These products do not irritate the anus.     · If your doctor recommends it, use an over-the-counter hydrocortisone cream on the skin in your anal area. This can reduce pain and itching after surgery.     · Apply ice several times a day for 10 minutes at a time.     · Try lying on your stomach with a pillow under your hips to decrease swelling. Follow-up care is a key part of your treatment and safety. Be sure to make and go to all appointments, and call your doctor if you are having problems. It's also a good idea to know your test results and keep a list of the medicines you take. When should you call for help? Call 911 anytime you think you may need emergency care. For example, call if:    · You passed out (lost consciousness).     · You are short of breath.    Call your doctor now or seek immediate medical care if:    · You have signs of infection, such as:  ? Increased pain, swelling, warmth, or redness. ? Red streaks leading from the area. ? Pus draining from the area. ? A fever.     · You have pain that does not get better after you take your pain medicine.     · You are sick to your stomach and cannot keep fluids down.     · You have signs of a blood clot in your leg (called a deep vein thrombosis), such as:  ? Pain in your calf, back of the knee, thigh, or groin. ? Redness and swelling in your leg or groin.     · You cannot pass stools or gas.    Watch closely for changes in your health, and be sure to contact your doctor if you have any problems. Where can you learn more? Go to http://aguilar-lance.info/. Enter 96 [id number] in the search box to learn more about \"Hemorrhoidectomy: What to Expect at Home. \"  Current as of: November 7, 2018  Content Version: 12.1  © 4456-3123 [employer]. Care instructions adapted under license by [employer] (which disclaims liability or warranty for this information).  If you have questions about a medical condition or this instruction, always ask your healthcare professional. Juliann Burns disclaims any warranty or liability for your use of this information. After general anesthesia or intravenous sedation, for 24 hours or while taking prescription Narcotics:  · Limit your activities  · A responsible adult needs to be with you for the next 24 hours  · Do not drive and operate hazardous machinery  · Do not make important personal or business decisions  · Do  not drink alcoholic beverages  · If you have not urinated within 8 hours after discharge, please contact your surgeon on call. · If you have sleep apnea and have a CPAP machine, please use it for all naps and sleeping. · Please use caution when taking narcotics and any of your home medications that may cause drowsiness. *  Please give a list of your current medications to your Primary Care Provider. *  Please update this list whenever your medications are discontinued, doses are      changed, or new medications (including over-the-counter products) are added. *  Please carry medication information at all times in case of emergency situations. These are general instructions for a healthy lifestyle:  No smoking/ No tobacco products/ Avoid exposure to second hand smoke  Surgeon General's Warning:  Quitting smoking now greatly reduces serious risk to your health. Obesity, smoking, and sedentary lifestyle greatly increases your risk for illness  A healthy diet, regular physical exercise & weight monitoring are important for maintaining a healthy lifestyle    You may be retaining fluid if you have a history of heart failure or if you experience any of the following symptoms:  Weight gain of 3 pounds or more overnight or 5 pounds in a week, increased swelling in our hands or feet or shortness of breath while lying flat in bed. Please call your doctor as soon as you notice any of these symptoms; do not wait until your next office visit.

## 2019-09-19 NOTE — OP NOTES
[facility]  OPERATIVE REPORT    Name:  Jarred Brooks  MR#:  [id number]  :  1960  ACCOUNT #:  [de-identified]  DATE OF SERVICE:  2019    PREOPERATIVE DIAGNOSIS:  Extensive hemorrhoids with bleeding. POSTOPERATIVE DIAGNOSIS:  Extensive hemorrhoids with bleeding. PROCEDURE PERFORMED:  Examination under anesthesia with hemorrhoidectomy x5. SURGEON:  Bonni Sandifer, MD    ANESTHESIA: general    COMPLICATIONS:  none    SPECIMENS REMOVED:  As above    ESTIMATED BLOOD LOSS:  Minimum. INDICATION:  This is a 52-year-old female who presented with rectal bleeding. She was found to have extensive hemorrhoids and surgery offered to her. She understood risks and benefits and agreed to proceed. FINDINGS:  She does have extensive hemorrhoids both internally and externally, also has extensive skin tags. PROCEDURE:  After informed consent obtained, the patient brought into the operating room. General anesthesia was administered. The patient then positioned in the prone position. Her anal area was prepped and draped in a routine fashion. A speculum was inserted and the findings as described above. She also had multiple fresh bleeding from irritated mucosa and hemorrhoids. Then, using a small LigaSure device I took the hemorrhoids including the skin tag in columns. Five columns were taken and the skin was left in between and the hemorrhoidectomy site was closed with running 3-0 chromic stitch and good hemostasis obtained and then local anesthetic was used to infiltrate perianally for postop pain control. The anal canal was packed with 5% lidocaine cream.  The patient tolerated the procedure well, transferred to recovery room in stable condition. All the instrument count and lap count were correct.       Anand Valadez MD      [email or web address]  D:  2019 8:32  T:  2019 13:24  JOB #:  [id number]

## 2019-09-19 NOTE — BRIEF OP NOTE
BRIEF OPERATIVE NOTE    Date of Procedure: 9/19/2019   Preoperative Diagnosis: Hemorrhoids, unspecified hemorrhoid type [K64.9]  Postoperative Diagnosis: Hemorrhoids, unspecified hemorrhoid type [K64.9]    Procedure(s):  RECTAL EXAM UNDER ANESTHESIA (EUA)  HEMORRHOIDECTOMY x 5,   Surgeon(s) and Role:     Cyrus Hill MD - Primary         Surgical Staff:  Circ-1: Raquel Farrar RN  Scrub Tech-1: Michelle Bowles  Scrub Tech-2: Marianela Teague CNA  Event Time In Time Out   Incision Start  7:45 AM    Incision Close       Anesthesia: General   Estimated Blood Loss: minimal  Specimens:   ID Type Source Tests Collected by Time Destination   1 : hemorrhoids Preservative Rectum  Drake Quintanilla MD 9/19/2019  7:53 AM Pathology      Findings: extensive hemorrhoids and skin tags  Complications: none  Implants:packing

## 2019-09-19 NOTE — ANESTHESIA POSTPROCEDURE EVALUATION
Procedure(s):  RECTAL EXAM UNDER ANESTHESIA (EUA)  HEMORRHOIDECTOMY. general    Anesthesia Post Evaluation        Patient location during evaluation: PACU  Patient participation: complete - patient participated  Level of consciousness: responsive to verbal stimuli  Pain management: adequate  Airway patency: patent  Anesthetic complications: no  Cardiovascular status: acceptable  Respiratory status: acceptable  Hydration status: euvolemic        Vitals Value Taken Time   /93 9/19/2019  9:28 AM   Temp 36.6 °C (97.9 °F) 9/19/2019  9:23 AM   Pulse 69 9/19/2019  9:28 AM   Resp 16 9/19/2019  9:23 AM   SpO2 92 % 9/19/2019  9:28 AM   Vitals shown include unvalidated device data.

## 2019-09-20 ENCOUNTER — HOSPITAL ENCOUNTER (EMERGENCY)
Age: 59
Discharge: HOME | End: 2019-09-21
Attending: EMERGENCY MEDICINE
Payer: OTHER GOVERNMENT

## 2019-09-20 DIAGNOSIS — K91.89 POSTOPERATIVE SURGICAL COMPLICATION INVOLVING DIGESTIVE SYSTEM ASSOCIATED WITH DIGESTIVE SYSTEM PROCEDURE, UNSPECIFIED COMPLICATION: Primary | ICD-10-CM

## 2019-09-20 DIAGNOSIS — K62.5 ANAL BLEEDING: ICD-10-CM

## 2019-09-20 PROCEDURE — 99284 EMERGENCY DEPT VISIT MOD MDM: CPT | Performed by: EMERGENCY MEDICINE

## 2019-09-21 ENCOUNTER — APPOINTMENT (OUTPATIENT)
Dept: GENERAL RADIOLOGY | Age: 59
End: 2019-09-21
Attending: EMERGENCY MEDICINE
Payer: OTHER GOVERNMENT

## 2019-09-21 VITALS
TEMPERATURE: 98.1 F | SYSTOLIC BLOOD PRESSURE: 186 MMHG | HEIGHT: 61 IN | HEART RATE: 72 BPM | OXYGEN SATURATION: 97 % | RESPIRATION RATE: 16 BRPM | BODY MASS INDEX: 25.68 KG/M2 | WEIGHT: 136 LBS | DIASTOLIC BLOOD PRESSURE: 95 MMHG

## 2019-09-21 LAB
ALBUMIN SERPL-MCNC: 3.8 G/DL (ref 3.5–5)
ALBUMIN/GLOB SERPL: 0.9 {RATIO} (ref 1.2–3.5)
ALP SERPL-CCNC: 74 U/L (ref 50–136)
ALT SERPL-CCNC: 21 U/L (ref 12–65)
ANION GAP SERPL CALC-SCNC: 7 MMOL/L (ref 7–16)
AST SERPL-CCNC: 13 U/L (ref 15–37)
BASOPHILS # BLD: 0 K/UL (ref 0–0.2)
BASOPHILS NFR BLD: 0 % (ref 0–2)
BILIRUB SERPL-MCNC: 0.4 MG/DL (ref 0.2–1.1)
BUN SERPL-MCNC: 6 MG/DL (ref 6–23)
CALCIUM SERPL-MCNC: 8.9 MG/DL (ref 8.3–10.4)
CHLORIDE SERPL-SCNC: 104 MMOL/L (ref 98–107)
CO2 SERPL-SCNC: 28 MMOL/L (ref 21–32)
CREAT SERPL-MCNC: 0.61 MG/DL (ref 0.6–1)
DIFFERENTIAL METHOD BLD: NORMAL
EOSINOPHIL # BLD: 0.2 K/UL (ref 0–0.8)
EOSINOPHIL NFR BLD: 2 % (ref 0.5–7.8)
ERYTHROCYTE [DISTWIDTH] IN BLOOD BY AUTOMATED COUNT: 12.4 % (ref 11.9–14.6)
GLOBULIN SER CALC-MCNC: 4.1 G/DL (ref 2.3–3.5)
GLUCOSE SERPL-MCNC: 146 MG/DL (ref 65–100)
HCT VFR BLD AUTO: 40 % (ref 35.8–46.3)
HGB BLD-MCNC: 13.3 G/DL (ref 11.7–15.4)
IMM GRANULOCYTES # BLD AUTO: 0 K/UL (ref 0–0.5)
IMM GRANULOCYTES NFR BLD AUTO: 0 % (ref 0–5)
LIPASE SERPL-CCNC: 48 U/L (ref 73–393)
LYMPHOCYTES # BLD: 1.9 K/UL (ref 0.5–4.6)
LYMPHOCYTES NFR BLD: 18 % (ref 13–44)
MCH RBC QN AUTO: 30.6 PG (ref 26.1–32.9)
MCHC RBC AUTO-ENTMCNC: 33.3 G/DL (ref 31.4–35)
MCV RBC AUTO: 92.2 FL (ref 79.6–97.8)
MONOCYTES # BLD: 0.7 K/UL (ref 0.1–1.3)
MONOCYTES NFR BLD: 6 % (ref 4–12)
NEUTS SEG # BLD: 7.4 K/UL (ref 1.7–8.2)
NEUTS SEG NFR BLD: 73 % (ref 43–78)
NRBC # BLD: 0 K/UL (ref 0–0.2)
PLATELET # BLD AUTO: 275 K/UL (ref 150–450)
PMV BLD AUTO: 9.8 FL (ref 9.4–12.3)
POTASSIUM SERPL-SCNC: 3.1 MMOL/L (ref 3.5–5.1)
PROT SERPL-MCNC: 7.9 G/DL (ref 6.3–8.2)
RBC # BLD AUTO: 4.34 M/UL (ref 4.05–5.2)
SODIUM SERPL-SCNC: 139 MMOL/L (ref 136–145)
WBC # BLD AUTO: 10.2 K/UL (ref 4.3–11.1)

## 2019-09-21 PROCEDURE — 83690 ASSAY OF LIPASE: CPT

## 2019-09-21 PROCEDURE — 85025 COMPLETE CBC W/AUTO DIFF WBC: CPT

## 2019-09-21 PROCEDURE — 80053 COMPREHEN METABOLIC PANEL: CPT

## 2019-09-21 PROCEDURE — 74022 RADEX COMPL AQT ABD SERIES: CPT

## 2019-09-21 RX ORDER — ONDANSETRON 4 MG/1
4 TABLET, ORALLY DISINTEGRATING ORAL
Qty: 4 TAB | Refills: 0 | Status: SHIPPED | OUTPATIENT
Start: 2019-09-21 | End: 2021-03-05

## 2019-09-21 NOTE — ED NOTES
Report from [facility], [facility]. Pt in bed awake, RR even and unlabored, family at bedside. Assumed care at this time.

## 2019-09-21 NOTE — ED PROVIDER NOTES
Patient is a 64 yo female with hemorrhoidectomy yesterday presents with some mild anal bleeding today and nausea and vomiting. Patient denies any abdominal pain at this time however states some cramping prior to vomiting, no fevers or chills, no further complaints. States she has not had to wear a pad however has worn a panty liner which has had a mild amount of red blood. Overall patient is very well appearing, in NAD. Anal Pain    Associated symptoms include nausea, rectal pain and vomiting. Pertinent negatives include no fever, no abdominal pain, no diarrhea, no chest pain, no headaches, no coughing and no rash.         Past Medical History:   Diagnosis Date    Cancer [facility])     rectal carcinoid    Hypertension     managed with meds       Past Surgical History:   Procedure Laterality Date    COLONOSCOPY N/A 6/4/2019    COLON[facility]COPY performed by Tanner [facility] at [facility] ENDOSCOPY    HX COLONOSCOPY      HX OTHER SURGICAL  ~2014    rectal surgery r/t cancer          Family History:   Problem Relation Age of Onset    Other Father         Cardiac Arrest    Heart Disease Father     Other Mother         Heart Problems    Heart Disease Mother     Heart Disease Brother     Heart Disease Brother     Heart Disease Brother     Heart Disease Brother     Breast Cancer Neg Hx        Social History     Socioeconomic History    Marital status:      Spouse name: Not on file    Number of children: Not on file    Years of education: Not on file    Highest education level: Not on file   Occupational History    Not on file   Social Needs    Financial resource strain: Not on file    Food insecurity:     Worry: Not on file     Inability: Not on file    Transportation needs:     Medical: Not on file     Non-medical: Not on file   Tobacco Use    Smoking status: Never Smoker    Smokeless tobacco: Never Used   Substance and Sexual Activity    Alcohol use: No    Drug use: Never    Sexual activity: Not on file   Lifestyle    Physical activity:     Days per week: Not on file     Minutes per session: Not on file    Stress: Not on file   Relationships    Social connections:     Talks on phone: Not on file     Gets together: Not on file     Attends Mosque service: Not on file     Active member of club or organization: Not on file     Attends meetings of clubs or organizations: Not on file     Relationship status: Not on file    Intimate partner violence:     Fear of current or ex partner: Not on file     Emotionally abused: Not on file     Physically abused: Not on file     Forced sexual activity: Not on file   Other Topics Concern    Not on file   Social History Narrative    Not on file         ALLERGIES: Aspirin    Review of Systems   Constitutional: Negative for chills and fever. HENT: Negative for rhinorrhea and sore throat. Eyes: Negative for visual disturbance. Respiratory: Negative for cough and shortness of breath. Cardiovascular: Negative for chest pain and leg swelling. Gastrointestinal: Positive for anal bleeding, nausea, rectal pain and vomiting. Negative for abdominal pain and diarrhea. Genitourinary: Negative for dysuria. Musculoskeletal: Negative for back pain and neck pain. Skin: Negative for rash. Neurological: Negative for weakness and headaches. Psychiatric/Behavioral: The patient is not nervous/anxious. Vitals:    09/20/19 2314   BP: (!) 185/91   Pulse: 66   Resp: 16   Temp: 98.1 °F (36.7 °C)   SpO2: 95%   Weight: 61.7 kg (136 lb)   Height: 5' 1\" (1.549 m)            Physical Exam   Constitutional: She is oriented to person, place, and time. She appears well-developed and well-nourished. HENT:   Head: Normocephalic. Right Ear: External ear normal.   Left Ear: External ear normal.   Eyes: Pupils are equal, round, and reactive to light. Conjunctivae and EOM are normal.   Neck: Normal range of motion. Neck supple. No tracheal deviation present.    Cardiovascular: Normal rate, regular rhythm, normal heart sounds and intact distal pulses. No murmur heard. Pulmonary/Chest: Effort normal and breath sounds normal. No respiratory distress. Abdominal: Soft. She exhibits no distension and no mass. There is no tenderness. There is no rebound and no guarding. No hernia. Non-tender to deep palpation through-out entire abdomen. Very benign abdominal exam.     Genitourinary:   Genitourinary Comments: Rectum with sutures visualized at rectum with mild swelling surrounding sutures without any active bleeding, no drainage or redness   Musculoskeletal: Normal range of motion. Neurological: She is alert and oriented to person, place, and time. No cranial nerve deficit. Skin: No rash noted. Nursing note and vitals reviewed. MDM  Number of Diagnoses or Management Options     Amount and/or Complexity of Data Reviewed  Clinical lab tests: ordered and reviewed  Tests in the radiology section of CPT®: ordered and reviewed  Review and summarize past medical records: yes    Risk of Complications, Morbidity, and/or Mortality  Presenting problems: high  Diagnostic procedures: high  Management options: high    Patient Progress  Patient progress: stable         Procedures  Recent Results (from the past 12 hour(s))   CBC WITH AUTOMATED DIFF    Collection Time: 09/21/19 12:00 AM   Result Value Ref Range    WBC 10.2 4.3 - 11.1 K/uL    RBC 4.34 4.05 - 5.2 M/uL    HGB 13.3 11.7 - 15.4 g/dL    HCT 40.0 35.8 - 46.3 %    MCV 92.2 79.6 - 97.8 FL    MCH 30.6 26.1 - 32.9 PG    MCHC 33.3 31.4 - 35.0 g/dL    RDW 12.4 11.9 - 14.6 %    PLATELET 622 163 - 271 K/uL    MPV 9.8 9.4 - 12.3 FL    ABSOLUTE NRBC 0.00 0.0 - 0.2 K/uL    DF AUTOMATED      NEUTROPHILS 73 43 - 78 %    LYMPHOCYTES 18 13 - 44 %    MONOCYTES 6 4.0 - 12.0 %    EOSINOPHILS 2 0.5 - 7.8 %    BASOPHILS 0 0.0 - 2.0 %    IMMATURE GRANULOCYTES 0 0.0 - 5.0 %    ABS. NEUTROPHILS 7.4 1.7 - 8.2 K/UL    ABS.  LYMPHOCYTES 1.9 0.5 - 4.6 K/UL ABS. MONOCYTES 0.7 0.1 - 1.3 K/UL    ABS. EOSINOPHILS 0.2 0.0 - 0.8 K/UL    ABS. BASOPHILS 0.0 0.0 - 0.2 K/UL    ABS. IMM. GRANS. 0.0 0.0 - 0.5 K/UL   METABOLIC PANEL, COMPREHENSIVE    Collection Time: 09/21/19 12:00 AM   Result Value Ref Range    Sodium 139 136 - 145 mmol/L    Potassium 3.1 (L) 3.5 - 5.1 mmol/L    Chloride 104 98 - 107 mmol/L    CO2 28 21 - 32 mmol/L    Anion gap 7 7 - 16 mmol/L    Glucose 146 (H) 65 - 100 mg/dL    BUN 6 6 - 23 MG/DL    Creatinine 0.61 0.6 - 1.0 MG/DL    GFR est AA >60 >60 ml/min/1.73m2    GFR est non-AA >60 >60 ml/min/1.73m2    Calcium 8.9 8.3 - 10.4 MG/DL    Bilirubin, total 0.4 0.2 - 1.1 MG/DL    ALT (SGPT) 21 12 - 65 U/L    AST (SGOT) 13 (L) 15 - 37 U/L    Alk. phosphatase 74 50 - 136 U/L    Protein, total 7.9 6.3 - 8.2 g/dL    Albumin 3.8 3.5 - 5.0 g/dL    Globulin 4.1 (H) 2.3 - 3.5 g/dL    A-G Ratio 0.9 (L) 1.2 - 3.5     LIPASE    Collection Time: 09/21/19 12:00 AM   Result Value Ref Range    Lipase 48 (L) 73 - 393 U/L     Xr Abd Acute W 1 V Chest    Result Date: 9/21/2019  EXAM: Acute abdomen series. INDICATION: Nausea and vomiting. COMPARISON: None. TECHNIQUE: Supine and upright views of the abdomen were supplemented with a frontal view of the chest. FINDINGS: The heart is enlarged. The lungs are clear. No pneumothorax or pleural effusion is seen. There is moderate constipation. No bowel obstruction or free intraperitoneal air is identified. IMPRESSION: 1. Cardiomegaly. 2. Constipation. 62 yo female with rectal pain:    Labs are stable, surgical scar intact without active bleeding or any signs of infection and patient is VERY well appearing and in NAD. I discussed patient with Dr. Cristopher Carpenter who agrees with plan to follow up with Dr. Raina Yepez on Monday or return with any further bleeding or any fevers or chills or any further concerns. Also of note patient has not taken her stool softener as prescribed which I discussed with her and she will begin taking regularly.

## 2019-09-21 NOTE — ED TRIAGE NOTES
Pt states that she had a hemorrhoidectomy yesterday and having rectal bleeding. Also, c/o nausea and vomiting.   Dr Gloria Abdullahi did the surgery

## 2019-09-21 NOTE — DISCHARGE INSTRUCTIONS
AS WE DISCUSSED, RETURN WITH ANY RETURN OR WORSENING BLEEDING FROM YOUR RECTUM OR ANY ABDOMINAL PAIN, NAUSEA OR VOMITING, FEVERS OR CHILLS. ENSURE YOU ARE TAKING YOUR STOOL SOFTENER AND FOLLOW UP WITH DR. Ethel Sterling FIRST THING Monday FOR RECHECK.

## 2019-09-21 NOTE — ED NOTES
I have reviewed discharge instructions with the patient. The patient verbalized understanding. Patient left ED via Discharge Method: ambulatory to Home with family/friend. Opportunity for questions and clarification provided. Patient given 1 script. To continue your aftercare when you leave the hospital, you may receive an automated call from our care team to check in on how you are doing. This is a free service and part of our promise to provide the best care and service to meet your aftercare needs.  If you have questions, or wish to unsubscribe from this service please call [phone]. Thank you for Choosing our [facility] Emergency Department.

## 2019-11-05 ENCOUNTER — HOSPITAL ENCOUNTER (OUTPATIENT)
Dept: MAMMOGRAPHY | Age: 59
Discharge: HOME | End: 2019-11-05
Attending: FAMILY MEDICINE
Payer: OTHER GOVERNMENT

## 2019-11-05 DIAGNOSIS — Z12.31 SCREENING MAMMOGRAM, ENCOUNTER FOR: ICD-10-CM

## 2019-11-05 PROCEDURE — 77067 SCR MAMMO BI INCL CAD: CPT

## 2019-12-11 ENCOUNTER — HOSPITAL ENCOUNTER (EMERGENCY)
Age: 59
Discharge: HOME | End: 2019-12-11
Attending: STUDENT IN AN ORGANIZED HEALTH CARE EDUCATION/TRAINING PROGRAM
Payer: OTHER GOVERNMENT

## 2019-12-11 ENCOUNTER — APPOINTMENT (OUTPATIENT)
Dept: GENERAL RADIOLOGY | Age: 59
End: 2019-12-11
Attending: STUDENT IN AN ORGANIZED HEALTH CARE EDUCATION/TRAINING PROGRAM
Payer: OTHER GOVERNMENT

## 2019-12-11 VITALS
TEMPERATURE: 98 F | DIASTOLIC BLOOD PRESSURE: 92 MMHG | HEIGHT: 61 IN | SYSTOLIC BLOOD PRESSURE: 155 MMHG | OXYGEN SATURATION: 99 % | BODY MASS INDEX: 26.43 KG/M2 | RESPIRATION RATE: 16 BRPM | WEIGHT: 140 LBS | HEART RATE: 80 BPM

## 2019-12-11 DIAGNOSIS — S70.11XA CONTUSION OF RIGHT HIP AND THIGH, INITIAL ENCOUNTER: Primary | ICD-10-CM

## 2019-12-11 DIAGNOSIS — S70.01XA CONTUSION OF RIGHT HIP AND THIGH, INITIAL ENCOUNTER: Primary | ICD-10-CM

## 2019-12-11 PROCEDURE — 72100 X-RAY EXAM L-S SPINE 2/3 VWS: CPT

## 2019-12-11 PROCEDURE — 99283 EMERGENCY DEPT VISIT LOW MDM: CPT | Performed by: STUDENT IN AN ORGANIZED HEALTH CARE EDUCATION/TRAINING PROGRAM

## 2019-12-11 PROCEDURE — 73502 X-RAY EXAM HIP UNI 2-3 VIEWS: CPT

## 2019-12-11 PROCEDURE — 74011250637 HC RX REV CODE- 250/637: Performed by: STUDENT IN AN ORGANIZED HEALTH CARE EDUCATION/TRAINING PROGRAM

## 2019-12-11 RX ORDER — CYCLOBENZAPRINE HCL 5 MG
10 TABLET ORAL
Qty: 20 TAB | Refills: 0 | Status: SHIPPED | OUTPATIENT
Start: 2019-12-11 | End: 2021-03-05

## 2019-12-11 RX ORDER — HYDROCODONE BITARTRATE AND ACETAMINOPHEN 7.5; 325 MG/1; MG/1
1 TABLET ORAL
Status: COMPLETED | OUTPATIENT
Start: 2019-12-11 | End: 2019-12-11

## 2019-12-11 RX ORDER — IBUPROFEN 800 MG/1
800 TABLET ORAL
Qty: 20 TAB | Refills: 0 | Status: SHIPPED | OUTPATIENT
Start: 2019-12-11 | End: 2019-12-18

## 2019-12-11 RX ADMIN — HYDROCODONE BITARTRATE AND ACETAMINOPHEN 1 TABLET: 7.5; 325 TABLET ORAL at 19:34

## 2019-12-11 NOTE — ED TRIAGE NOTES
Patient advises she was at work and was standing on a step stool and lost her balance landing on right hip. Patient complaining of pain to tailbone area and right hip. No loss of consciousness during event.

## 2019-12-12 NOTE — DISCHARGE INSTRUCTIONS
Your x-rays show no evidence of fracture or dislocation. Treat pain with the medication prescribed. Apply ice to tender areas. Arrange follow-up care with your primary care provider. Return to this department for worsening symptoms, concerns or questions.

## 2019-12-12 NOTE — ED PROVIDER NOTES
16-year-old female patient presents with report of buttock pain and right hip pain after a mechanical fall off of a stool. Patient was at work and states she was standing on a stool approximately 1 to 2 feet tall. She stepped off of the stool, following backwards, landing flat on her back. She denies head strike or loss of consciousness. Patient states she is been able to ambulate since the incident. She reports significant pain over the tailbone and right hip. Patient denies previous injuries to these areas. There is no numbness, tingling or weakness. Pain is constant nature and worsened with movement.            Past Medical History:   Diagnosis Date    Cancer [facility])     rectal carcinoid    Hypertension     managed with meds    Menopause        Past Surgical History:   Procedure Laterality Date    COLONOSCOPY N/A 6/4/2019    COLONOS[facility] performed by [facility]ruth[facility] Palumbo [facility] ENDOSCOPY    HX COLONOSCOPY      HX OTHER SURGICAL  ~2014    rectal surgery r/t cancer          Family History:   Problem Relation Age of Onset    Other Father         Cardiac Arrest    Heart Disease Father     Other Mother         Heart Problems    Heart Disease Mother     Heart Disease Brother     Heart Disease Brother     Heart Disease Brother     Heart Disease Brother     Breast Cancer Neg Hx        Social History     Socioeconomic History    Marital status:      Spouse name: Not on file    Number of children: Not on file    Years of education: Not on file    Highest education level: Not on file   Occupational History    Not on file   Social Needs    Financial resource strain: Not on file    Food insecurity:     Worry: Not on file     Inability: Not on file    Transportation needs:     Medical: Not on file     Non-medical: Not on file   Tobacco Use    Smoking status: Never Smoker    Smokeless tobacco: Never Used   Substance and Sexual Activity    Alcohol use: No    Drug use: Never    Sexual activity: Not on file   Lifestyle    Physical activity:     Days per week: Not on file     Minutes per session: Not on file    Stress: Not on file   Relationships    Social connections:     Talks on phone: Not on file     Gets together: Not on file     Attends Scientology service: Not on file     Active member of club or organization: Not on file     Attends meetings of clubs or organizations: Not on file     Relationship status: Not on file    Intimate partner violence:     Fear of current or ex partner: Not on file     Emotionally abused: Not on file     Physically abused: Not on file     Forced sexual activity: Not on file   Other Topics Concern    Not on file   Social History Narrative    Not on file         ALLERGIES: Aspirin    Review of Systems   Constitutional: Negative for chills, diaphoresis and fever. HENT: Negative for congestion, sneezing and sore throat. Eyes: Negative for visual disturbance. Respiratory: Negative for cough, chest tightness, shortness of breath and wheezing. Cardiovascular: Negative for chest pain and leg swelling. Gastrointestinal: Negative for abdominal pain, blood in stool, diarrhea, nausea and vomiting. Endocrine: Negative for polyuria. Genitourinary: Negative for difficulty urinating, dysuria, flank pain, hematuria and urgency. Musculoskeletal: Positive for arthralgias, back pain and myalgias. Negative for neck pain and neck stiffness. Skin: Negative for color change and rash. Neurological: Negative for dizziness, syncope, speech difficulty, weakness, light-headedness, numbness and headaches. Psychiatric/Behavioral: Negative for behavioral problems. All other systems reviewed and are negative. Vitals:    12/11/19 1841   BP: 129/90   Pulse: 75   Resp: 16   Temp: 97.9 °F (36.6 °C)   SpO2: 99%   Weight: 63.5 kg (140 lb)   Height: 5' 1\" (1.549 m)            Physical Exam  Vitals signs and nursing note reviewed.    Constitutional:       General: She is not in acute distress. Appearance: She is well-developed. She is not diaphoretic. Comments: Alert and oriented to person place and time. No acute distress, speaks in clear, fluid sentences. HENT:      Head: Normocephalic and atraumatic. Right Ear: External ear normal.      Left Ear: External ear normal.      Nose: Nose normal.   Eyes:      Pupils: Pupils are equal, round, and reactive to light. Neck:      Musculoskeletal: Normal range of motion. Cardiovascular:      Rate and Rhythm: Normal rate and regular rhythm. Heart sounds: Normal heart sounds. No murmur. No friction rub. No gallop. Pulmonary:      Effort: Pulmonary effort is normal. No respiratory distress. Breath sounds: Normal breath sounds. No stridor. No decreased breath sounds, wheezing, rhonchi or rales. Chest:      Chest wall: No tenderness. Abdominal:      General: There is no distension. Palpations: Abdomen is soft. There is no mass. Tenderness: There is no tenderness. There is no guarding or rebound. Hernia: No hernia is present. Musculoskeletal: Normal range of motion. General: No tenderness or deformity. Comments: Reproducible pain with internal/external rotation of the right hip as well as on the left hip. There is no palpable step-off or deformity with palpation of the thoracic or lumbar spine. No signs of trauma on exam.  No reproducible discomfort with palpation of these areas. Skin:     General: Skin is warm and dry. Neurological:      Mental Status: She is alert and oriented to person, place, and time. Cranial Nerves: No cranial nerve deficit.           MDM       Procedures

## 2019-12-12 NOTE — ED NOTES
I have reviewed discharge instructions with the patient. The patient verbalized understanding. Patient left ED via Discharge Method: ambulatory to Home with son. Opportunity for questions and clarification provided. Patient given 2 scripts. Education was given to the pt with verbal feedback to nurse    The pt was in no acute distress at [place]. To continue your aftercare when you leave the hospital, you may receive an automated call from our care team to check in on how you are doing. This is a free service and part of our promise to provide the best care and service to meet your aftercare needs.  If you have questions, or wish to unsubscribe from this service please call [phone]. Thank you for Choosing our [facility] Emergency Department.

## 2020-02-14 ENCOUNTER — HOSPITAL ENCOUNTER (OUTPATIENT)
Dept: PHYSICAL THERAPY | Age: 60
Discharge: HOME | End: 2020-02-14
Payer: COMMERCIAL

## 2020-02-14 PROCEDURE — 97110 THERAPEUTIC EXERCISES: CPT

## 2020-02-14 PROCEDURE — 97161 PT EVAL LOW COMPLEX 20 MIN: CPT

## 2020-02-14 NOTE — PROGRESS NOTES
Kiley Carroll  : 1960  Payor: Nikita Velasquez / Plan: [place] [employer] / Product Type: Workers Comp /  Darl Zambian at [address], [place], [address] [address]  [phone]   [phone]      OUTPATIENT PHYSICAL THERAPY: Daily Treatment Note 2020 Visit Count:  1  ICD-10: Lumbago with Sciatica Right side (M54.41)  Radiculopathy, Lumbar Region (M54.16)  Muscle Weakness, Generalized (M62.81)  Muscle spasm of back (M62.830)  Pain in right hip (M25.551)  Pain in right knee (M25.561)     Precautions/Allergies:   Aspirin   MD Orders: Eval and Treat  MEDICAL/REFERRING DIAGNOSIS:  low back pain   DATE OF ONSET: 2019  REFERRING PHYSICIAN: Mara Govea,   RETURN PHYSICIAN APPOINTMENT: TBD by patient         Pre-treatment Symptoms/Complaints:  See Initial Eval Dated 20 for more details. Pain: Initial:6/10 Post Session: 6/10   Medications Last Reviewed:  2020     Updated Objective Findings: See Initial Eval for more details. TREATMENT:   THERAPEUTIC EXERCISE: (25 minutes):  Exercises per grid below to improve mobility, strength and balance. Required minimal visual, verbal and manual cues to promote proper body alignment and promote proper body posture. Progressed resistance and complexity of movement as indicated.      Date:  2020 Date:   Date:     Activity/Exercise Parameters Parameters Parameters   Education HEP, POC, PT goals, anatomy/pathology  Education on importance of walking and to decrease amount of sitting and sedentary lifestyle choices     Childspose 10 x     DKC 2 min     SKC 2 min     DKC with dynamic rotation 3 min     Prone on elbows 2 min  Progressed to B knee flexion  2 min     L counter stretch 2 min     Sciatic Nerve Glide 15x         MANUAL THERAPY: (0 minutes): Joint mobilization, Soft tissue mobilization was utilized and necessary because of the patient's restricted joint motion and restricted motion of soft tissue mobility. Date  2/14/2020    Technique Used Grade  Level # Time(s) Effect while being performed                                                          Med[employer] Portal  Treatment/Session Summary:    Response to Treatment: Pt demonstrated understanding of POC and initial HEP. No increase in pain or adverse reactions. Communication/Consultation:  POC, HEP, PT goals, Faxed initial evaluation to MD.   Equipment provided today: HEP Handout     Recommendations/Intent for next treatment session:   Next visit will focus on Flexion-based Exercises Extension-Based Exercises (EOTA) Core Stability Pain Science Education hip strengthening. Treatment Plan of Care Effective Dates: 2/14/2020 TO 4/14/2020 (60 days).   Frequency/Duration: 1 time a week for 60 Days             Total Treatment Billable Duration:   25  Rx plus Eval   PT Patient Time In/Time Out  Time In: 1020  Time Out: 86 Ulises Briceño, PT    Future Appointments   Date Time Provider Ilene Grayson   2/21/2020 11:15 AM Chin Sites, PT [facility]   2/28/2020 11:15 AM Chin Sites, PT [facility]   3/6/2020 11:15 AM Jodie Sites, PT [facility]   3/13/2020 11:15 AM Jodie Sites, PT [facility]   3/19/2020 11:15 AM Chin Sites, PT [facility]   7/13/2020  8:30 AM PST [facility] PST PST   7/29/2020  9:10 AM Ángel Mccormick MD [facility] PST PST

## 2020-02-14 NOTE — THERAPY EVALUATION
Lan Chan : 1960 Payor: Frankie Fernandez / [place] [employer] / Product Type: Workers Comp /  [address] at [facility] [address] [facility], [address] [place] [address], [facility], [address] [place] [address] [phone]   [phone] OUTPATIENT PHYSICAL THERAPY:Initial Assessment 2020 ICD-10: Lumbago with Sciatica Right side (M54.41) Radiculopathy, Lumbar Region (M54.16) Muscle Weakness, Generalized (M62.81) Muscle spasm of back (M62.830) Pain in right hip (M25.551) Pain in right knee (M25.561) Precautions/Allergies:  
Aspirin MD Orders: Eval and Treat  MEDICAL/REFERRING DIAGNOSIS: 
low back pain DATE OF ONSET: 2019 REFERRING PHYSICIAN: Darshana Govea DO 
RETURN PHYSICIAN APPOINTMENT: TBD by patient INITIAL ASSESSMENT:  Lan Chan presents to physical therapy with LBP that began after a work injury when she fell and hurt her back. Pt works 8 hours shifts where she sitting the majority of the time, but is able to stand and walk for brief periods of time. Pt is required to lift 15 to 20 lbs at work. Pt has a lifting restriction of 15 lbs. Pt reports difficulty sleeping, sitting, walking, and ability to complete household chores. Lan Chan responds positively to extension bias positions and is anticipated to return to prior level of function at work and home. Pt was late to scheduled therapay evaluation by 20 minutes. Lan Chan will benefit from skilled physical therapy (medically necessary) to address above deficits affecting participation in basic ADLs and functional mobility/tolerance.  Patient will benefit from manual therapeutic techniques (stretching, joint mobilizations, soft tissue mobilization/myofascial release), therapeutic exercises and activities to promote postural control and strengthen, education on body mechanics, and comprehensive home exercises program to address current impairments and functional limitations. PROBLEM LIST (Impacting functional limitations): 1. Decreased Strength 2. Decreased ADL/Functional Activities 3. Decreased Transfer Abilities 4. Decreased Ambulation Ability/Technique 5. Decreased Balance 6. Increased Pain 7. Decreased Activity Tolerance 8. Increased Fatigue 9. Increased Shortness of Breath 10. Decreased Flexibility/Joint Mobility 11. Decreased [place] with Home Exercise Program INTERVENTIONS PLANNED: 
1. Balance Exercise 2. Bed Mobility 3. Cold 4. Cryotherapy 5. Electrical Stimulation 6. Family Education 7. Gait Training 8. Heat 9. Home Exercise Program (HEP) 10. Manual Therapy 11. Neuromuscular Re-education/Strengthening 12. Range of Motion (ROM) 13. Therapeutic Activites 14. Therapeutic Exercise/Strengthening 15. Transfer Training 16. Lumbar Traction TREATMENT PLAN: 
Effective Dates: 2/14/2020 TO 4/14/2020 (60 days). Frequency/Duration: 1 time a week for 60 Days GOALS: (Goals have been discussed and agreed upon with patient.) Short-Term Goals: 30 days  Goal Met 1. Hudson Mckeon will be independent with HEP to maintain functional gains made with therapy intervention. 1.  [] Date: 2. Hudson Mckeon will be able to stand x 10 min with no more than 4/10 pain in order to prep meals at home. 2.  [] Date: 3. Hudson Mckeon will participate in walking program on treadmill x >=20 min with <= 4/10 pain to navigate community settings. 3.  [] Date: 4. Hudson Mckeon will improve hamstring length by greater than or equal 10 degrees to promote dynamic balance and decrease the risk for falls. 4.  [] Date:  
    
    
 Long Term Goals: 60 days Goal Met 1. Hudson Mckeon will demonstrate a 8 point improvement on the Oswestry to show improvement in function and participation in ADLs/IADLs 1. [] Date: 2. Elvis Lawrence will demonstrate 4+/5 hip abductor strength on manual muscle testing to promote stance limb control with gait and stair negotiation to prevent low back shear. 2.  [] Date: 3. Elvis Lawrence will demonstrate appropriate lifting technique from floor to waist level with 20lbs and no cues from therapist to complete work related tasks. 3.  [] Date: 4. Elvis Lawrence will be able to perform SLS >=30 seconds bilaterally to help with gait and improve balance 4. [] Date: 5. Elvis Lawrence will participate in walking program on treadmill x >=20 min with <= 2/10 pain to navigate community settings. 5.  [] Date: CLINICAL DECISION MAKING:  
  
Outcome Measure: Tool Used: Modified Oswestry Low Back Pain Questionnaire Score:  Initial: 12/50  Most Recent: X/50 (Date: -- ) Interpretation of Score: Each section is scored on a 0-5 scale, 5 representing the greatest disability. The scores of each section are added together for a total score of 50. Rehabilitation Potential For Stated Goals: Good Medical Necessity:  
· Skilled intervention continues to be required due to above deficits affecting participation in basic ADLs and overall functional tolerance. Reason for Services/Other Comments: 
· Patient continues to require skilled intervention due to  above deficits affecting participation in basic ADLs and overall functional tolerance. Total Duration: 15 minutes plus treatment PT Patient Time In/Time Out Time In: 1020 Time Out: 1100 Regarding Cabrera Georges's therapy, I certify that the treatment plan above will be carried out by a therapist or under their direction. Thank you for this referral, Debbie Saini PT Referring Physician Signature: Alka Costa DO              Date HISTORY:  
History of Present Injury/Illness (Reason for Referral): 
 
 Pt feel at work when she tripped and hurt her back. GOAL FOR THERAPY: to have no more pain Note: Patient denies any increase of symptoms with cough, sneeze or valsalva. Patient denies any saddle paresthesia or bowel/bladder deficits. · Aggravating factors: Standing, Walking, sitting, Prolonged sitting, Lifting and Sleeping · Relieving factors: Sitting, Ice, Self-medicating and leaning back · Irritability: Medium (Onset of pain is equal to alleviation) -Present symptoms/complaints (on day of evaluation) Pain Scale: · Current: 7/10 · Best: 4/10 · Worst: 8/10 Level of Function/Work/Activity: 
Current functional level: issues with household chores, and increased pain with completing shifts. Prior level of Function: 
Work/Hobbies: pt works on [employer], enjoys walking, sometimes helps with grandchildren Previous Treatment Approaches: 
None Past Medical History/Comorbidities:  
Ms. Amirah Balbuena  has a past medical history of Cancer ([facility].), Hypertension, and Menopause. Ms. Amirah Balbuena  has a past surgical history that includes hx colonoscopy; colonoscopy (N/A, 6/4/2019); and hx other surgical (~2014). Active Ambulatory Problems Diagnosis Date Noted  Hypertension  Essential hypertension 10/08/2015  Grade II hemorrhoids 08/23/2019 Resolved Ambulatory Problems Diagnosis Date Noted  No Resolved Ambulatory Problems Past Medical History:  
Diagnosis Date  Cancer ([facility]  Menopause Social History/Living Environment:  
Pt lives in a two level home with 3 steps to enter with left handrails. Pt lives with  and family. Dominant Side right Other Clinical Tests: 
Imaging: YES, MRI per patient Current Medications:   
  
Current Outpatient Medications:  
  losartan-hydroCHLOROthiazide (HYZAAR) 50-12.5 mg per tablet, TAKE 1 TABLET DAILY FOR HYPERTENSION, Disp: 90 Tab, Rfl: 3   cyclobenzaprine (FLEXERIL) 5 mg tablet, Take 2 Tabs by mouth three (3) times daily as needed for Muscle Spasm(s). , Disp: 20 Tab, Rfl: 0 
  verapamil ER (VERELAN) 240 mg ER capsule, Take 1 Cap by mouth daily (after dinner). Indications: high blood pressure, Disp: 90 Cap, Rfl: 3 
  ondansetron (ZOFRAN ODT) 4 mg disintegrating tablet, Take 1 Tab by mouth every eight (8) hours as needed for Nausea., Disp: 4 Tab, Rfl: 0 
  magnesium hydroxide (MILK OF MAGNESIA) 400 mg/5 mL suspension, Take 30 mL by mouth every twelve (12) hours as needed for Constipation (to keep daily soft bowel movement). , Disp: 1 Bottle, Rfl: 0 Date Last Reviewed:  2/14/2020 Ambulatory/Rehab Services H2 Model Falls Risk Assessment Risk Factors: 
     (5)  History of Recent Falls [w/in 3 months] Ability to Rise from Chair: 
     (0)  Ability to rise in a single movement Falls Prevention Plan: 
     Exercise/Equipment Adaptation (specify):  pt to complete with therpaist supervision Total: (5 or greater = High Risk): 5  
©2010 [facility] [place] [address] [place] [address] States Patent #[id number]. Federal Law prohibits the replication, distribution or use without written permission from [facility] [employer] Number of Personal Factors/Comorbidities that affect the Plan of Care: 0: LOW COMPLEXITY EXAMINATION:  
Observation/Orthostatic Postural Assessment:   
      Standing: Forward Head 
Rounded Shoulders Sitting: Forward Head 
Rounded Shoulders Thoracic Kyphosis Functional Mobility: Affecting participation in basic ADLs and functional tasks. Gait Observation: none Functional Squat to floor: 3.5 in from ground with downward reach. Sit to Stand: able to complete without hands or momentum Balance:   
 Single Leg Stance: R= 27 seconds / L= 18 seconds Palpation and Joint Mobility: 
      Muscle spasma dn sensitivity noted pt reports pain to all lumbar segements with grade 1  ROM:      
AROM/PROM Joint: Eval Date: 2/14/20  Re-Assess Date:  Re-Assess Date: Active ROM RIGHT LEFT RIGHT LEFT RIGHT LEFT Hip Flexion No restriction No restriction Hip Extension NT NT Hip Internal Rotation No restriction No restriction Knee Flexion No restriction No restriction Knee Extension No restriction No restriction Lumbar Flexion Guarded with all functional movements, lumbar ranges not objectively assessed Lumbar Extension Lumbar Side-bending Lumbar Rotation Strength:   
 Eval Date: 2/14/20  Re-Assess Date:  Re-Assess Date:   
  RIGHT LEFT RIGHT LEFT RIGHT LEFT Knee Flexion (L5-S2) 4+/5 4=/5 Knee Extension (L3, L4) 5/5 5/5 Hip Flexion (L1, L2) 4-/5 4-/5 Hip Extension 4-/5 4-/5 Hip Abduction (L5, S1) 4-/5 4-/5 Hip External Rotation 4-/5 4-/5 Ankle Dorsiflexion  5/5 5/5 Ankle Plantar Flexion (S1-S2) 20/20 Heel Raises, hand support  20/20 Heel Raises, hand support Strength:  0-5 scale, 0 no muscle contraction; 1 no joint motion but contraction felt; 2 -less than full ROM in gravity eliminated; 2 full ROM in grav eliminated; 2+ full ROM in grav eliminated and zaynab to withstand minimal resist; 3-less than full ROM against gravity; 3 full ROM against gravity;  3+ full ROM against gravity and able to withstand minimal resist; 4- full ROM against gravity and able to withstand less than mod resist; 4 full ROM against gravity and able to withstand mod resist; 5 full ROM against gravity and able to withstand max resist.  
 
Hamstring length from 90/90: 
 RIGHT LE: 40 dg 
 LEFT LE: 45 dg 
 
Prone Rectus Femoris Length: 
            RIGHT LE: unrestricted LEFT LE: No restriction Special Tests:  
· PHILIP: negative · SLR (<60 degrees): negative · SLUMP: negative · Prone Knee bend: negative Neurological Screen:  
 RADIATING SYMPTOMS?: YES, but reports no light touch sensory deficits. · T12-L1 (inguinal region and medial thigh)= none · L1-2 ( Ant/Med proximal thigh)= none · L2-3 (Ant/Lat thigh)= none · L3-4 (Post/Lat thigh and Ant tibial region)= yes · L4-5 (Dorsum foot)= none · L5-S1 (Lateral foot)= none Body Structures Involved: 1. Bones 2. Joints 3. Muscles 4. Ligaments Body Functions Affected: 1. Sensory/Pain 2. Neuromusculoskeletal 
3. Movement Related Activities and Participation Affected: 1. Mobility 2. Self Care Number of elements that affect the Plan of Care: 1-2: LOW COMPLEXITY CLINICAL PRESENTATION:  
Presentation: Stable and uncomplicated: LOW COMPLEXITY CLINICAL DECISION MAKING:  
  
Use of outcome tool(s) and clinical judgement create a POC that gives a: Clear prediction of patient's progress: LOW COMPLEXITY

## 2020-02-21 ENCOUNTER — HOSPITAL ENCOUNTER (OUTPATIENT)
Dept: PHYSICAL THERAPY | Age: 60
Discharge: HOME | End: 2020-02-21
Payer: COMMERCIAL

## 2020-02-21 PROCEDURE — 97110 THERAPEUTIC EXERCISES: CPT

## 2020-02-21 NOTE — PROGRESS NOTES
Lan Chan  : 1960  Payor: Frankie Fernandez / [place] [employer] / Product Type: Workers Comp /  [address] [place] [address] at [address] [place] [address], [address], [facility], [address] [place] [address]  [phone]   [phone]      OUTPATIENT PHYSICAL THERAPY: Daily Treatment Note 2020 Visit Count:  2  ICD-10: Lumbago with Sciatica Right side (M54.41)  Radiculopathy, Lumbar Region (M54.16)  Muscle Weakness, Generalized (M62.81)  Muscle spasm of back (M62.830)  Pain in right hip (M25.551)  Pain in right knee (M25.561)     Precautions/Allergies:   Aspirin   MD Orders: Eval and Treat  MEDICAL/REFERRING DIAGNOSIS:  low back pain   DATE OF ONSET: 2019  REFERRING PHYSICIAN: Darshana Govea DO  RETURN PHYSICIAN APPOINTMENT: TBD by patient         Pre-treatment Symptoms/Complaints:  I am doing a little bett, I am sore and have some pain  Pain: Initial:0/10 Post Session: 0/10   Medications Last Reviewed:  2020     Updated Objective Findings: None Today        TREATMENT:   THERAPEUTIC EXERCISE: (40 minutes):  Exercises per grid below to improve mobility, strength and balance. Required minimal visual, verbal and manual cues to promote proper body alignment and promote proper body posture. Progressed resistance and complexity of movement as indicated.      Date:  2020 Date:  20 Date:     Activity/Exercise Parameters Parameters Parameters   Education HEP, POC, PT goals, anatomy/pathology  Education on importance of walking and to decrease amount of sitting and sedentary lifestyle choices     Childspose 10 x     DKC 2 min 2 min    SKC 2 min 2 min    DKC with dynamic rotation 3 min 5 min    Prone on elbows 2 min  Progressed to B knee flexion  2 min     L counter stretch 2 min 2 min    Sciatic Nerve Glide 15x 15 x(left/right)    Nu STEP  lvl 5, 611 steps  10 min    Vizcaino Carry  10 lbs each hand  6 x 100ft    Mini squat with lift  10 lb  15 in block  3 x 10 reps Standing lateral flexion stretch  10x left/right          [employer] Portal  Treatment/Session Summary:    Response to Treatment: Pt has no pain throughout session today that can be controlled with self mobs and stretching. Pt demos ability to complete lifting of 10 lbs and carry without increased irritability. Communication/Consultation:  None today. Equipment provided today: HEP Handout     Recommendations/Intent for next treatment session:   Next visit will focus on Flexion-based Exercises Extension-Based Exercises (EOTA) Core Stability Pain Science Education hip strengthening. Treatment Plan of Care Effective Dates: 2/14/2020 TO 4/14/2020 (60 days).   Frequency/Duration: 1 time a week for 60 Days         Total Treatment Billable Duration:  40 min   PT Patient Time In/Time Out  Time In: 1120  Time Out: 1200  Bonita De La Vega, PT    Future Appointments   Date Time Provider Ilene Grayson   2/28/2020 11:15 AM Shahab Louie, PT [facility] AND [facility]   3/6/2020 11:15 AM Shahab Louie, PT [facility]   3/13/2020 11:15 AM [facility]   3/19/2020 11:15 AM Kristofer Yan PT [facility]   7/13/2020  8:30 AM PST LAB [facility] PST PST   7/29/2020  9:10 AM Mendy Cox MD [facility] PST PST

## 2020-02-28 ENCOUNTER — HOSPITAL ENCOUNTER (OUTPATIENT)
Dept: PHYSICAL THERAPY | Age: 60
Discharge: HOME | End: 2020-02-28
Payer: COMMERCIAL

## 2020-02-28 PROCEDURE — 97110 THERAPEUTIC EXERCISES: CPT

## 2020-02-28 NOTE — PROGRESS NOTES
Tru Alfredo  : 1960  Payor: Kasandra Orlando / [place] [employer] / Product Type: Workers Comp /  William Encarnacion at [address] [facility], [address], [facility], [address] [place] [address] Road  [phone]   [phone]      OUTPATIENT PHYSICAL THERAPY: Daily Treatment Note 2020 Visit Count:  3  ICD-10: Lumbago with Sciatica Right side (M54.41)  Radiculopathy, Lumbar Region (M54.16)  Muscle Weakness, Generalized (M62.81)  Muscle spasm of back (M62.830)  Pain in right hip (M25.551)  Pain in right knee (M25.561)     Precautions/Allergies:   Aspirin   MD Orders: Eval and Treat  MEDICAL/REFERRING DIAGNOSIS:  low back pain   DATE OF ONSET: 2019  REFERRING PHYSICIAN: Ro Govea,   RETURN PHYSICIAN APPOINTMENT: TBD by patient         Pre-treatment Symptoms/Complaints: I have very little pain in the morning when I wake up now. Pain: Initial:0/10 Post Session: 0/10   Medications Last Reviewed:  2020     Updated Objective Findings: None Today        TREATMENT:   THERAPEUTIC EXERCISE: ( 40 minutes):  Exercises per grid below to improve mobility, strength and balance. Required minimal visual, verbal and manual cues to promote proper body alignment and promote proper body posture. Progressed resistance and complexity of movement as indicated.      Date:  2020 Date:  20 Date:  20   Activity/Exercise Parameters Parameters Parameters   Education HEP, POC, PT goals, anatomy/pathology  Education on importance of walking and to decrease amount of sitting and sedentary lifestyle choices     Childspose 10 x     DKC 2 min 2 min    SKC 2 min 2 min    DKC with dynamic rotation 3 min 5 min    Prone on elbows 2 min  Progressed to B knee flexion  2 min     L counter stretch 2 min 2 min 2 min x 2 reps   Sciatic Nerve Glide 15x 15 x(left/right) Downward dog   2 min   Nu STEP  lvl 5, 611 steps  10 min    Vizcaino Carry  10 lbs each hand  6 x 100ft 10 lb each hand  6 x 100ft  Progressed to asymmetrical weight  5lb, 10 lbs  4 x 100 ft   Mini squat with lift  10 lb  15 in block  3 x 10 reps 10lbs from floor  3 x 10 reps   Standing lateral flexion stretch  10x left/right    Treadmill Walking   11 min  1.8 mph  0.32 miles   Tray Carry   10 lbs  4 x 100ft   Squat with diagonal lift (left/right)   3 x 10 reps         [facility] Portal  Treatment/Session Summary:    Response to Treatment: Pt with improving strength and ability to complete lift from floor. Pt is able to self manage LBP with L stretch throughout sessions and bring LBP back to baseline of 0-1/10 exercises. Communication/Consultation:  None today. Equipment provided today: None today. Recommendations/Intent for next treatment session:   Next visit will focus on lifting, walking, pushing and pulling    Treatment Plan of Care Effective Dates: 2/14/2020 TO 4/14/2020 (60 days).   Frequency/Duration: 1 time a week for 60 Days         Total Treatment Billable Duration:  40 min   PT Patient Time In/Time Out  Time In: 1120  Time Out: 1200  Nato Parry PT    Future Appointments   Date Time Provider Ilene Grayson   3/6/2020 11:15 AM Nhi Giordano, PT [facility] AND [facility]   3/13/2020 11:15 AM Nhi Giordano, PT [facility]   3/19/2020 11:15 AM Getachew Tinajero PT [facility]   7/13/2020  8:30 AM PST LAB [facility] PST PST   7/29/2020  9:10 AM MD FAHEEM Frederick PST PST

## 2020-03-06 ENCOUNTER — HOSPITAL ENCOUNTER (OUTPATIENT)
Dept: PHYSICAL THERAPY | Age: 60
Discharge: HOME | End: 2020-03-06
Payer: COMMERCIAL

## 2020-03-06 PROCEDURE — 97110 THERAPEUTIC EXERCISES: CPT

## 2020-03-06 NOTE — PROGRESS NOTES
Jassi Odell  : 1960  Payor: Gela Jessica / [place] [employer] [place] [employer] / Product Type: Workers Comp /  Macarena Rogers at [address] [facility], [address], [facility], [address] [place] [address] Road  [phone]   [phone]      OUTPATIENT PHYSICAL THERAPY: Daily Treatment Note 3/6/2020 Visit Count:  4  ICD-10: Lumbago with Sciatica Right side (M54.41)  Radiculopathy, Lumbar Region (M54.16)  Muscle Weakness, Generalized (M62.81)  Muscle spasm of back (M62.830)  Pain in right hip (M25.551)  Pain in right knee (M25.561)     Precautions/Allergies:   Aspirin   MD Orders: Eval and Treat  MEDICAL/REFERRING DIAGNOSIS:  low back pain   DATE OF ONSET: 2019  REFERRING PHYSICIAN: Beverley Govea DO  RETURN PHYSICIAN APPOINTMENT: TBD by patient         Pre-treatment Symptoms/Complaints: I was sore after last time, I felt tired in my right leg. Pain: Initial:0/10 Post Session: 0/10   Medications Last Reviewed:  3/6/2020     Updated Objective Findings: None Today        TREATMENT:   THERAPEUTIC EXERCISE: ( 40 minutes):  Exercises per grid below to improve mobility, strength and balance. Required minimal visual, verbal and manual cues to promote proper body alignment and promote proper body posture. Progressed resistance and complexity of movement as indicated.      Date:  2020 Date:  20 Date:  20 Date:  3/6/20   Activity/Exercise Parameters Parameters Parameters    Education HEP, POC, PT goals, anatomy/pathology  Education on importance of walking and to decrease amount of sitting and sedentary lifestyle choices      Childspose 10 x      DKC 2 min 2 min     SKC 2 min 2 min     DKC with dynamic rotation 3 min 5 min  3 min   Prone on elbows 2 min  Progressed to B knee flexion  2 min      L counter stretch 2 min 2 min 2 min x 2 reps 3 min   Sciatic Nerve Glide 15x 15 x(left/right) Downward dog   2 min    Nu STEP  lvl 5, 611 steps  10 min     TimePoints  10 lbs each hand  6 x 100ft 10 lb each hand  6 x 100ft  Progressed to asymmetrical weight  5lb, 10 lbs  4 x 100 ft 10lb, 5lb  3 x 100ft   Mini squat with lift  10 lb  15 in block  3 x 10 reps 10lbs from floor  3 x 10 reps 10 lbs from floor  3 x 10 reps   Standing lateral flexion stretch  10x left/right     Treadmill Walking   11 min  1.8 mph  0.32 miles 11 min, 2 mph   Tray Carry   10 lbs  4 x 100ft 10 lbs  4 x 100 ft   Squat with diagonal chops (left/right)   3 x 10 reps 5 lbs  2 x 10 reps   Long sitting obliques    2 x 1:30 min         [facility] Portal  Treatment/Session Summary:    Response to Treatment: Pt with improving ambulation distance and no report of pain. Pt is able to complete chops with 5lb weight today and c/o no back pain indicating improving ability to complete functional lifting and carrying tasks. Communication/Consultation:  None today. Equipment provided today: None today. Recommendations/Intent for next treatment session:   Next visit will focus on lifting, walking, pushing and pulling    Treatment Plan of Care Effective Dates: 2/14/2020 TO 4/14/2020 (60 days).   Frequency/Duration: 1 time a week for 60 Days         Total Treatment Billable Duration:  40 min   PT Patient Time In/Time Out  Time In: 1115  Time Out: 7 Esthela Veronica PT    Future Appointments   Date Time Provider Ilene Grayson   3/13/2020 11:15 AM Ember Hall PT [facility] AND [facility]   3/19/2020 11:15 AM Nikolai Goff PT [facility]   7/13/2020  8:30 AM PST LAB [facility] PST PST   7/29/2020  9:10 AM Tiffanie Lucia MD [facility] PST PST

## 2020-03-13 ENCOUNTER — HOSPITAL ENCOUNTER (OUTPATIENT)
Dept: PHYSICAL THERAPY | Age: 60
Discharge: HOME | End: 2020-03-13
Payer: COMMERCIAL

## 2020-03-13 PROCEDURE — 97110 THERAPEUTIC EXERCISES: CPT

## 2020-03-13 NOTE — PROGRESS NOTES
Mario Moore  : 1960  Payor: Dell Carrillo / Plan: [place] [employer] / Product Type: Workers Comp /  [address] at [address], [address], [facility], [id number] [place] [address] Road  [phone]   [phone]      OUTPATIENT PHYSICAL THERAPY: Daily Treatment Note 3/13/2020 Visit Count:  5  ICD-10: Lumbago with Sciatica Right side (M54.41)  Radiculopathy, Lumbar Region (M54.16)  Muscle Weakness, Generalized (M62.81)  Muscle spasm of back (M62.830)  Pain in right hip (M25.551)  Pain in right knee (M25.561)     Precautions/Allergies:   Aspirin   MD Orders: Eval and Treat  MEDICAL/REFERRING DIAGNOSIS:  low back pain   DATE OF ONSET: 2019  REFERRING PHYSICIAN: Daniel Govea,   RETURN PHYSICIAN APPOINTMENT: TBD by patient         Pre-treatment Symptoms/Complaints: I can lift and I am not tired I am getting better. Pain: Initial:0/10 Post Session: 0/10   Medications Last Reviewed:  3/13/2020     Updated Objective Findings: None Today        TREATMENT:   THERAPEUTIC EXERCISE: ( 45 minutes):  Exercises per grid below to improve mobility, strength and balance. Required minimal visual, verbal and manual cues to promote proper body alignment and promote proper body posture. Progressed resistance and complexity of movement as indicated.      Date:  2020 Date:  20 Date:  20 Date:  3/6/20 Date:  3/13/20   Activity/Exercise Parameters Parameters Parameters     Education HEP, POC, PT goals, anatomy/pathology  Education on importance of walking and to decrease amount of sitting and sedentary lifestyle choices       Childspose 10 x       DKC 2 min 2 min      SKC 2 min 2 min      DKC with dynamic rotation 3 min 5 min  3 min    Prone on elbows 2 min  Progressed to B knee flexion  2 min       L counter stretch 2 min 2 min 2 min x 2 reps 3 min 3 min   Sciatic Nerve Glide 15x 15 x(left/right) Downward dog   2 min     Nu STEP  lvl 5, 611 steps  10 min Vizcaino Carry  10 lbs each hand  6 x 100ft 10 lb each hand  6 x 100ft  Progressed to asymmetrical weight  5lb, 10 lbs  4 x 100 ft 10lb, 5lb  3 x 100ft 15 lb  3 x 150ft   Mini squat with lift  10 lb  15 in block  3 x 10 reps 10lbs from floor  3 x 10 reps 10 lbs from floor  3 x 10 reps 15 lb from floor  3 x 10 reps   Standing lateral flexion stretch  10x left/right      Treadmill Walking   11 min  1.8 mph  0.32 miles 11 min, 2 mph 11 min, incline 2, 2.7 mph   Tray Carry   10 lbs  4 x 100ft 10 lbs  4 x 100 ft    Squat with diagonal chops (left/right)   3 x 10 reps 5 lbs  2 x 10 reps    Long sitting obliques    2 x 1:30 min    Walk outs on cable column     Posterior, 23 lbs  15 x  Lateral (left/right) 20 lbs  15x   Overhead reach with Left/right step     10lb kettle bell  3 x 10 reps         [employer] Portal  Treatment/Session Summary:    Response to Treatment: Pt increased LE and core strength as progressing resistance with functional movements to include lifting, carrying, and overhead reaching for return to work. Pt has no pain throughout session and reports improved function at work. Pt on target for d/c next session. Communication/Consultation:  None today. Equipment provided today: None today. Recommendations/Intent for next treatment session:   Next visit will assess goals for d/c and review HEP as needed. Treatment Plan of Care Effective Dates: 2/14/2020 TO 4/14/2020 (60 days).   Frequency/Duration: 1 time a week for 60 Days         Total Treatment Billable Duration:  45 min   PT Patient Time In/Time Out  Time In: 1115  Time Out: 1200  Sandra Spine, PT    Future Appointments   Date Time Provider Ilene Renuka   3/19/2020 11:15 AM Nikolai Goff PT [id number]   7/13/2020  8:30 AM PST LAB [facility] PST PST   7/29/2020  9:10 AM MD FAHEEM Martin PST PST

## 2020-03-19 ENCOUNTER — APPOINTMENT (OUTPATIENT)
Dept: PHYSICAL THERAPY | Age: 60
End: 2020-03-19
Payer: COMMERCIAL

## 2020-03-27 ENCOUNTER — APPOINTMENT (OUTPATIENT)
Dept: PHYSICAL THERAPY | Age: 60
End: 2020-03-27
Payer: COMMERCIAL

## 2020-04-03 ENCOUNTER — APPOINTMENT (OUTPATIENT)
Dept: PHYSICAL THERAPY | Age: 60
End: 2020-04-03

## 2020-04-10 ENCOUNTER — APPOINTMENT (OUTPATIENT)
Dept: PHYSICAL THERAPY | Age: 60
End: 2020-04-10

## 2020-06-11 NOTE — THERAPY DISCHARGE
Lauren Dalal  : 1960      Payor: Marijane Nyhan / [place] [employer] / Product Type: Workers Comp /  [address] [facility] at [address] [place] [address], [address], [facility], [address] [place] [address]  [phone]   [phone]       OUTPATIENT PHYSICAL THERAPY:Discontinuation Summary 2020    ICD-10: Lumbago with Sciatica Right side (M54.41)  Radiculopathy, Lumbar Region (M54.16)  Muscle Weakness, Generalized (M62.81)  Muscle spasm of back (M62.830)  Pain in right hip (M25.551)  Pain in right knee (M25.561)    Precautions/Allergies:   Aspirin   MD Orders: Eval and Treat  MEDICAL/REFERRING DIAGNOSIS:  low back pain   DATE OF ONSET: 2019  REFERRING PHYSICIAN: Rosalee Govea DO  RETURN PHYSICIAN APPOINTMENT: TBD by patient     Discontinuation ASSESSMENT:  Lauren Dalal was preparing for discharge on next visit, however due to COVID-19 pandemic clinic was closed. Pt has not returned to therapy or has requested further intervention at this time. Therapist unable to assess progress toward goals due to self d/c. PT POC closed. TREATMENT PLAN: Therapist unable to assess goals secondary to self d/c due to COVID-19 pandemic. Effective Dates: 2020 TO 2020 (60 days). Frequency/Duration: 1 time a week for 60 Days    GOALS: (Goals have been discussed and agreed upon with patient.)   Short-Term Goals: 30 days  Goal Met   1. Lauren Dalal will be independent with HEP to maintain functional gains made with therapy intervention. 1.  [] Date:   2. Lauren Dalal will be able to stand x 10 min with no more than 4/10 pain in order to prep meals at home. 2.  [] Date:   3. Lauren Dalal will participate in walking program on treadmill x >=20 min with <= 4/10 pain to navigate community settings. 3.  [] Date:   4.  Lauren Dalal will improve hamstring length by greater than or equal 10 degrees to promote dynamic balance and decrease the risk for falls. 4.  [] Date:              Long Term Goals: 60 days Goal Met   1. Tarik Mckeon will demonstrate a 8 point improvement on the Oswestry to show improvement in function and participation in ADLs/IADLs 1. [] Date:   2. Tarik Mckeon will demonstrate 4+/5 hip abductor strength on manual muscle testing to promote stance limb control with gait and stair negotiation to prevent low back shear. 2.  [] Date:   3. Tarik Mckeon will demonstrate appropriate lifting technique from floor to waist level with 20lbs and no cues from therapist to complete work related tasks. 3.  [] Date:   4. Tarik Mckeon will be able to perform SLS >=30 seconds bilaterally to help with gait and improve balance 4. [] Date:   5. Tarik Mckeon will participate in walking program on treadmill x >=20 min with <= 2/10 pain to navigate community settings. 5.  [] Date:          CLINICAL DECISION MAKING:      Outcome Measure: Tool Used: Modified Oswestry Low Back Pain Questionnaire  Score:  Initial: 12/50  Most Recent: X/50 (Date: -- )   Interpretation of Score: Each section is scored on a 0-5 scale, 5 representing the greatest disability. The scores of each section are added together for a total score of 50. Rehabilitation Potential For Stated Goals: Good    Medical Necessity:   · Skilled intervention continues to be required due to above deficits affecting participation in basic ADLs and overall functional tolerance. Reason for Services/Other Comments:  · Patient continues to require skilled intervention due to  above deficits affecting participation in basic ADLs and overall functional tolerance. Total Duration: 0 minutes    Regarding [facility] Marilee Georges's therapy, I certify that the treatment plan above will be carried out by a therapist or under their direction.   Thank you for this referral,  Beatriz Springer, PT     Referring Physician Signature: Mary Anne Abad, DO No signature required.

## 2021-08-03 PROBLEM — I10 HYPERTENSION: Status: RESOLVED | Noted: 2021-08-03 | Resolved: 2021-08-03

## 2021-12-23 ENCOUNTER — HOSPITAL ENCOUNTER (OUTPATIENT)
Dept: MAMMOGRAPHY | Age: 61
Discharge: HOME | End: 2021-12-23
Attending: FAMILY MEDICINE
Payer: OTHER GOVERNMENT

## 2021-12-23 DIAGNOSIS — Z78.0 POSTMENOPAUSAL: ICD-10-CM

## 2021-12-23 DIAGNOSIS — Z13.820 SCREENING FOR OSTEOPOROSIS: ICD-10-CM

## 2021-12-23 DIAGNOSIS — Z12.31 SCREENING MAMMOGRAM, ENCOUNTER FOR: ICD-10-CM

## 2021-12-23 PROCEDURE — 77080 DXA BONE DENSITY AXIAL: CPT

## 2021-12-23 PROCEDURE — 77067 SCR MAMMO BI INCL CAD: CPT

## 2022-03-19 PROBLEM — K64.1 GRADE II HEMORRHOIDS: Status: ACTIVE | Noted: 2019-08-23

## 2022-05-24 DIAGNOSIS — E55.9 VITAMIN D DEFICIENCY: ICD-10-CM

## 2022-05-24 DIAGNOSIS — Z00.00 ROUTINE GENERAL MEDICAL EXAMINATION AT A HEALTH CARE FACILITY: ICD-10-CM

## 2022-05-24 DIAGNOSIS — I10 ESSENTIAL HYPERTENSION: Primary | ICD-10-CM

## 2022-05-26 LAB
25(OH)D3 SERPL-MCNC: 14.6 NG/ML (ref 30–100)
ALBUMIN SERPL-MCNC: 4.1 G/DL (ref 3.2–4.6)
ALBUMIN/GLOB SERPL: 1.1 {RATIO} (ref 1.2–3.5)
ALP SERPL-CCNC: 82 U/L (ref 50–136)
ALT SERPL-CCNC: 42 U/L (ref 12–65)
ANION GAP SERPL CALC-SCNC: 4 MMOL/L (ref 7–16)
AST SERPL-CCNC: 17 U/L (ref 15–37)
BASOPHILS # BLD: 0 K/UL (ref 0–0.2)
BASOPHILS NFR BLD: 1 % (ref 0–2)
BILIRUB SERPL-MCNC: 0.5 MG/DL (ref 0.2–1.1)
BUN SERPL-MCNC: 12 MG/DL (ref 8–23)
CALCIUM SERPL-MCNC: 8.9 MG/DL (ref 8.3–10.4)
CHLORIDE SERPL-SCNC: 106 MMOL/L (ref 98–107)
CHOLEST SERPL-MCNC: 174 MG/DL
CO2 SERPL-SCNC: 29 MMOL/L (ref 21–32)
CREAT SERPL-MCNC: 0.6 MG/DL (ref 0.6–1)
DIFFERENTIAL METHOD BLD: NORMAL
EOSINOPHIL # BLD: 0.3 K/UL (ref 0–0.8)
EOSINOPHIL NFR BLD: 5 % (ref 0.5–7.8)
ERYTHROCYTE [DISTWIDTH] IN BLOOD BY AUTOMATED COUNT: 12.5 % (ref 11.9–14.6)
GLOBULIN SER CALC-MCNC: 3.7 G/DL (ref 2.3–3.5)
GLUCOSE SERPL-MCNC: 130 MG/DL (ref 65–100)
HCT VFR BLD AUTO: 40.9 % (ref 35.8–46.3)
HDLC SERPL-MCNC: 48 MG/DL (ref 40–60)
HDLC SERPL: 3.6 {RATIO}
HGB BLD-MCNC: 13.5 G/DL (ref 11.7–15.4)
IMM GRANULOCYTES # BLD AUTO: 0 K/UL (ref 0–0.5)
IMM GRANULOCYTES NFR BLD AUTO: 0 % (ref 0–5)
LDLC SERPL CALC-MCNC: 101 MG/DL
LYMPHOCYTES # BLD: 2.3 K/UL (ref 0.5–4.6)
LYMPHOCYTES NFR BLD: 39 % (ref 13–44)
MCH RBC QN AUTO: 30.6 PG (ref 26.1–32.9)
MCHC RBC AUTO-ENTMCNC: 33 G/DL (ref 31.4–35)
MCV RBC AUTO: 92.7 FL (ref 79.6–97.8)
MONOCYTES # BLD: 0.5 K/UL (ref 0.1–1.3)
MONOCYTES NFR BLD: 8 % (ref 4–12)
NEUTS SEG # BLD: 2.8 K/UL (ref 1.7–8.2)
NEUTS SEG NFR BLD: 47 % (ref 43–78)
NRBC # BLD: 0 K/UL (ref 0–0.2)
PLATELET # BLD AUTO: 278 K/UL (ref 150–450)
PMV BLD AUTO: 10.4 FL (ref 9.4–12.3)
POTASSIUM SERPL-SCNC: 4 MMOL/L (ref 3.5–5.1)
PROT SERPL-MCNC: 7.8 G/DL (ref 6.3–8.2)
RBC # BLD AUTO: 4.41 M/UL (ref 4.05–5.2)
SODIUM SERPL-SCNC: 139 MMOL/L (ref 136–145)
TRIGL SERPL-MCNC: 125 MG/DL (ref 35–150)
TSH, 3RD GENERATION: 0.61 UIU/ML (ref 0.36–3.74)
VLDLC SERPL CALC-MCNC: 25 MG/DL (ref 6–23)
WBC # BLD AUTO: 5.8 K/UL (ref 4.3–11.1)

## 2022-08-15 ENCOUNTER — NURSE TRIAGE (OUTPATIENT)
Dept: OTHER | Facility: CLINIC | Age: 62
End: 2022-08-15

## 2022-08-15 NOTE — TELEPHONE ENCOUNTER
Received call from Lisa Duque at [facility] with [employer]. Subjective: Caller states \"I tried to do exercising my arm but it is hurting on my shoulder and arm. I starting taking Ibuprofen. Also want to know my lab results. I missed my appt after my lab work. I want to see my doctor before my vacation\"     Current Symptoms: left shoulder and arm pain. States pain primarily in upper arm. Pain not worse with movement. Constant pain. No trauma, denies heavy lifting, denies known trigger for pain. States feels like a muscle pain. Pain improvement with movement but not resolved. Onset: 1 week ago; constant    Associated Symptoms: pain not radiating into chest. No swelling or redness to arm. Pain Severity: 5/10; \"just a hurting\"; constant. Radiating to shoulder, and scapula. Temperature: denies     What has been tried: Ibuprofen and massaging without improvement. LMP: NA Pregnant: NA    Recommended disposition: Go to ED Now. Care advice provided, patient verbalizes understanding; denies any other questions or concerns; instructed to call back for any new or worsening symptoms. Patient/caller agrees to proceed to nearest Emergency Department     Attention Provider: Thank you for allowing me to participate in the care of your patient. The patient was connected to triage in response to information provided to the ECC/PSC. Please do not respond through this encounter as the response is not directed to a shared pool.           Reason for Disposition   Age > 40 and no obvious cause for pain, pain still present even when not moving the arm    Protocols used: Arm Pain-ADULT-[place]

## 2022-08-19 ENCOUNTER — OFFICE VISIT (OUTPATIENT)
Dept: FAMILY MEDICINE CLINIC | Facility: CLINIC | Age: 62
End: 2022-08-19
Payer: OTHER GOVERNMENT

## 2022-08-19 VITALS
HEIGHT: 61 IN | WEIGHT: 145 LBS | SYSTOLIC BLOOD PRESSURE: 140 MMHG | DIASTOLIC BLOOD PRESSURE: 90 MMHG | BODY MASS INDEX: 27.38 KG/M2

## 2022-08-19 DIAGNOSIS — I10 ESSENTIAL HYPERTENSION: ICD-10-CM

## 2022-08-19 DIAGNOSIS — Z12.31 SCREENING MAMMOGRAM FOR HIGH-RISK PATIENT: ICD-10-CM

## 2022-08-19 DIAGNOSIS — R73.09 OTHER ABNORMAL GLUCOSE: ICD-10-CM

## 2022-08-19 DIAGNOSIS — Z13.1 SCREENING FOR DIABETES MELLITUS: ICD-10-CM

## 2022-08-19 DIAGNOSIS — Z13.31 SCREENING FOR DEPRESSION: ICD-10-CM

## 2022-08-19 DIAGNOSIS — M25.512 ACUTE PAIN OF LEFT SHOULDER: ICD-10-CM

## 2022-08-19 DIAGNOSIS — Z00.00 ROUTINE GENERAL MEDICAL EXAMINATION AT A HEALTH CARE FACILITY: Primary | ICD-10-CM

## 2022-08-19 DIAGNOSIS — E55.9 VITAMIN D DEFICIENCY: ICD-10-CM

## 2022-08-19 PROCEDURE — 99396 PREV VISIT EST AGE 40-64: CPT | Performed by: FAMILY MEDICINE

## 2022-08-19 RX ORDER — VERAPAMIL HYDROCHLORIDE 240 MG/1
240 CAPSULE, EXTENDED RELEASE ORAL NIGHTLY
Qty: 90 CAPSULE | Refills: 3 | Status: SHIPPED | OUTPATIENT
Start: 2022-08-19 | End: 2022-08-19 | Stop reason: SDUPTHER

## 2022-08-19 RX ORDER — NAPROXEN 500 MG/1
500 TABLET ORAL 2 TIMES DAILY PRN
Qty: 60 TABLET | Refills: 0 | Status: SHIPPED | OUTPATIENT
Start: 2022-08-19

## 2022-08-19 RX ORDER — VERAPAMIL HYDROCHLORIDE 240 MG/1
240 CAPSULE, EXTENDED RELEASE ORAL NIGHTLY
Qty: 90 CAPSULE | Refills: 3 | Status: SHIPPED | OUTPATIENT
Start: 2022-08-19 | End: 2022-09-27 | Stop reason: SDUPTHER

## 2022-08-19 RX ORDER — LOSARTAN POTASSIUM AND HYDROCHLOROTHIAZIDE 12.5; 5 MG/1; MG/1
1 TABLET ORAL DAILY
Qty: 90 TABLET | Refills: 3 | Status: SHIPPED | OUTPATIENT
Start: 2022-08-19

## 2022-08-19 RX ORDER — LOSARTAN POTASSIUM AND HYDROCHLOROTHIAZIDE 12.5; 5 MG/1; MG/1
1 TABLET ORAL DAILY
Qty: 90 TABLET | Refills: 3 | Status: SHIPPED | OUTPATIENT
Start: 2022-08-19 | End: 2022-08-19 | Stop reason: SDUPTHER

## 2022-08-19 ASSESSMENT — ENCOUNTER SYMPTOMS
SHORTNESS OF BREATH: 0
COUGH: 0
ABDOMINAL PAIN: 0

## 2022-08-19 ASSESSMENT — PATIENT HEALTH QUESTIONNAIRE - PHQ9
SUM OF ALL RESPONSES TO PHQ9 QUESTIONS 1 & 2: 1
SUM OF ALL RESPONSES TO PHQ QUESTIONS 1-9: 1
2. FEELING DOWN, DEPRESSED OR HOPELESS: 1
SUM OF ALL RESPONSES TO PHQ QUESTIONS 1-9: 1
1. LITTLE INTEREST OR PLEASURE IN DOING THINGS: 0
SUM OF ALL RESPONSES TO PHQ QUESTIONS 1-9: 1
SUM OF ALL RESPONSES TO PHQ QUESTIONS 1-9: 1

## 2022-08-19 NOTE — PROGRESS NOTES
PROGRESS NOTE    SUBJECTIVE:   Yovany Velazquez is a 65 y.o. female seen for a follow up visit regarding the following chief complaint:     Chief Complaint   Patient presents with    Shoulder Pain     L shoulder and arm pain for 1 week, pt went to  and they did an Xray of the shoulder. Annual Exam    Discuss Labs           HPI presents office today complaining of left shoulder pain and then wants to know how her work came back she is missed her physical twice schedule back in May so we told her to do her physical today as well since she is leaving going to [facility] and wants her medication      Past Medical History, Past Surgical History, Family history, Social History, and Medications were all reviewed with the patient today and updated as necessary. Current Outpatient Medications   Medication Sig Dispense Refill    verapamil (VERELAN) 240 MG extended release capsule Take 1 capsule by mouth nightly 90 capsule 3    losartan-hydroCHLOROthiazide (HYZAAR) 50-12.5 MG per tablet Take 1 tablet by mouth daily TAKE 1 TABLET DAILY FOR HYPERTENSION 90 tablet 3    naproxen (NAPROSYN) 500 MG tablet Take 1 tablet by mouth 2 times daily as needed for Pain 60 tablet 0     No current facility-administered medications for this visit.      Allergies   Allergen Reactions    Aspirin Rash     Patient Active Problem List   Diagnosis    Essential hypertension    Grade II hemorrhoids     Past Medical History:   Diagnosis Date    Cancer ([facility] [place] [address]     rectal carcinoid    Hypertension     managed with meds    Menopause      Past Surgical History:   Procedure Laterality Date    COLONOSCOPY N/A 6/4/2019    COLONOSCOPY performed by Patel Hughes MD at [facility] ENDOSCOPY    COLONOSCOPY      OTHER SURGICAL HISTORY      2014     Family History   Problem Relation Age of Onset    Other Father         Cardiac Arrest    Heart Disease Father     Other Mother         Heart Problems    Heart Disease Mother     Heart Disease Brother     Heart Disease Brother     Heart Disease Brother     Heart Disease Brother     Breast Cancer Neg Hx      Social History     Tobacco Use    Smoking status: Never     Passive exposure: Never    Smokeless tobacco: Never   Substance Use Topics    Alcohol use: No         Review of Systems   Constitutional:  Negative for chills and fever. Respiratory:  Negative for cough and shortness of breath. Cardiovascular:  Negative for chest pain. Gastrointestinal:  Negative for abdominal pain. Endocrine: Negative for cold intolerance and heat intolerance. Genitourinary:  Negative for difficulty urinating, frequency, hematuria, pelvic pain, vaginal bleeding, vaginal discharge and vaginal pain. Musculoskeletal:         Left shoulder pain   Neurological:  Negative for headaches. Psychiatric/Behavioral: Negative. OBJECTIVE:  BP (!) 140/90   Ht 5' 1\" (1.549 m)   Wt 145 lb (65.8 kg)   BMI 27.40 kg/m²      Physical Exam  Vitals and nursing note reviewed. Constitutional:       Appearance: Normal appearance. HENT:      Head: Normocephalic and atraumatic. Nose: Nose normal.      Mouth/Throat:      Mouth: Mucous membranes are moist.      Pharynx: No oropharyngeal exudate or posterior oropharyngeal erythema. Eyes:      Extraocular Movements: Extraocular movements intact. Conjunctiva/sclera: Conjunctivae normal.      Pupils: Pupils are equal, round, and reactive to light. Cardiovascular:      Rate and Rhythm: Normal rate and regular rhythm. Pulses: Normal pulses. Heart sounds: Normal heart sounds. Pulmonary:      Effort: Pulmonary effort is normal.      Breath sounds: Normal breath sounds. Abdominal:      General: Abdomen is flat. Bowel sounds are normal.      Palpations: Abdomen is soft. Genitourinary:     Comments: Deferred done by OB/GYN  Musculoskeletal:         General: Normal range of motion. Right shoulder: Normal.      Left shoulder: Tenderness present.       Cervical back: Normal range of motion and neck supple. Skin:     General: Skin is warm. Capillary Refill: Capillary refill takes less than 2 seconds. Neurological:      General: No focal deficit present. Mental Status: She is alert and oriented to person, place, and time. Psychiatric:         Mood and Affect: Mood normal.         Behavior: Behavior normal.         Thought Content: Thought content normal.         Judgment: Judgment normal.        Medical problems and test results were reviewed with the patient today. No results found for this or any previous visit (from the past 672 hour(s)).     ASSESSMENT and PLAN    Visit Diagnoses and Associated Orders       Routine general medical examination at a health care facility    -  Primary    HIV 1/2 Ag/Ab, 4TH Generation,W Rflx Confirm [98819 CPT(R)]      Hemoglobin A1C [32387 Custom]   - Future Order    AMB POC URINALYSIS DIP STICK MANUAL W/O MICRO [69725 CPT(R)]   - Future Order    Hepatitis C Antibody [74120 Custom]   - Future Order    TSH [00162 Custom]   - Future Order    Vitamin D 25 Hydroxy [02104 Custom]   - Future Order    Lipid Panel [75458 Custom]   - Future Order    Comprehensive Metabolic Panel [70394 Custom]   - Future Order    AMB POC KTY COMPLETE CBC [96776 CPT(R)]   - Future Order    HIV 1/2 Ag/Ab, 4TH Generation,W Rflx Confirm [48969 CPT(R)]   - Future Order         Essential hypertension        verapamil (VERELAN) 240 MG extended release capsule [31617]      losartan-hydroCHLOROthiazide (HYZAAR) 50-12.5 MG per tablet [23394]           Vitamin D deficiency             Acute pain of left shoulder        naproxen (NAPROSYN) 500 MG tablet [5393]           Screening for diabetes mellitus             Screening for depression             Other abnormal glucose        Hemoglobin A1C [41007 Custom]           Screening mammogram for high-risk patient        ARLEY DIGITAL SCREEN W OR WO CAD BILATERAL [85156 Custom]   - Future Order                     Diagnosis Orders   1. Routine general medical examination at a health care facility  HIV 1/2 Ag/Ab, 4TH Generation,W Rflx Confirm    Hemoglobin A1C    AMB POC URINALYSIS DIP STICK MANUAL W/O MICRO    Hepatitis C Antibody    TSH    Vitamin D 25 Hydroxy    Lipid Panel    Comprehensive Metabolic Panel    AMB POC KTY COMPLETE CBC    HIV 1/2 Ag/Ab, 4TH Generation,W Rflx Confirm      2. Essential hypertension  verapamil (VERELAN) 240 MG extended release capsule    losartan-hydroCHLOROthiazide (HYZAAR) 50-12.5 MG per tablet    DISCONTINUED: losartan-hydroCHLOROthiazide (HYZAAR) 50-12.5 MG per tablet    DISCONTINUED: verapamil (VERELAN) 240 MG extended release capsule      3. Vitamin D deficiency        4. Acute pain of left shoulder  naproxen (NAPROSYN) 500 MG tablet      5. Screening for diabetes mellitus        6. Screening for depression        7. Other abnormal glucose  Hemoglobin A1C      8. Screening mammogram for high-risk patient  ARLEY DIGITAL SCREEN W OR WO CAD BILATERAL      , Cosmo Belen was seen today for shoulder pain, annual exam and discuss labs. Diagnoses and all orders for this visit:    Routine general medical examination at a health care facility  -     HIV 1/2 Ag/Ab, 4TH Generation,W Rflx Confirm  -     Hemoglobin A1C; Future  -     AMB POC URINALYSIS DIP STICK MANUAL W/O MICRO; Future  -     Hepatitis C Antibody; Future  -     TSH; Future  -     Vitamin D 25 Hydroxy; Future  -     Lipid Panel; Future  -     Comprehensive Metabolic Panel; Future  -     AMB POC KTY COMPLETE CBC; Future  -     HIV 1/2 Ag/Ab, 4TH Generation,W Rflx Confirm; Future    Essential hypertension  -     Discontinue: losartan-hydroCHLOROthiazide (HYZAAR) 50-12.5 MG per tablet; Take 1 tablet by mouth daily TAKE 1 TABLET DAILY FOR HYPERTENSION  -     Discontinue: verapamil (VERELAN) 240 MG extended release capsule; Take 1 capsule by mouth nightly  -     verapamil (VERELAN) 240 MG extended release capsule;  Take 1 capsule by mouth nightly  - losartan-hydroCHLOROthiazide (HYZAAR) 50-12.5 MG per tablet; Take 1 tablet by mouth daily TAKE 1 TABLET DAILY FOR HYPERTENSION    Vitamin D deficiency    Acute pain of left shoulder  -     naproxen (NAPROSYN) 500 MG tablet; Take 1 tablet by mouth 2 times daily as needed for Pain    Screening for diabetes mellitus    Screening for depression    Other abnormal glucose  -     Hemoglobin A1C    Screening mammogram for high-risk patient  -     ARLEY DIGITAL SCREEN W OR WO CAD BILATERAL; Future  , Reviewed her labs answered all her questions counseling supportive care gone over we will start her on exercises for her shoulder we will start her on Naprosyn 500 mg 1 p.o. twice daily with food and we will see her back in May when she is due for physical.I have spent a total of 8-15 minutes assessing, reviewing, and discussing the depression screening with patient in office today.

## 2022-09-27 ENCOUNTER — TELEPHONE (OUTPATIENT)
Dept: FAMILY MEDICINE CLINIC | Facility: CLINIC | Age: 62
End: 2022-09-27

## 2022-09-27 DIAGNOSIS — I10 ESSENTIAL HYPERTENSION: ICD-10-CM

## 2022-09-27 RX ORDER — VERAPAMIL HYDROCHLORIDE 240 MG/1
240 CAPSULE, EXTENDED RELEASE ORAL NIGHTLY
Qty: 90 CAPSULE | Refills: 3 | Status: SHIPPED | OUTPATIENT
Start: 2022-09-27

## 2022-09-29 ENCOUNTER — HOSPITAL ENCOUNTER (EMERGENCY)
Age: 62
Discharge: HOME | End: 2022-09-29
Attending: EMERGENCY MEDICINE
Payer: OTHER GOVERNMENT

## 2022-09-29 VITALS
WEIGHT: 140 LBS | SYSTOLIC BLOOD PRESSURE: 158 MMHG | RESPIRATION RATE: 18 BRPM | DIASTOLIC BLOOD PRESSURE: 101 MMHG | TEMPERATURE: 98.3 F | HEART RATE: 85 BPM | HEIGHT: 61 IN | OXYGEN SATURATION: 96 % | BODY MASS INDEX: 26.43 KG/M2

## 2022-09-29 DIAGNOSIS — H02.89 PAIN AND SWELLING OF UPPER EYELID OF RIGHT EYE: Primary | ICD-10-CM

## 2022-09-29 DIAGNOSIS — H02.841 PAIN AND SWELLING OF UPPER EYELID OF RIGHT EYE: Primary | ICD-10-CM

## 2022-09-29 PROCEDURE — 99284 EMERGENCY DEPT VISIT MOD MDM: CPT

## 2022-09-29 PROCEDURE — 96372 THER/PROPH/DIAG INJ SC/IM: CPT

## 2022-09-29 PROCEDURE — 6360000002 HC RX W HCPCS: Performed by: STUDENT IN AN ORGANIZED HEALTH CARE EDUCATION/TRAINING PROGRAM

## 2022-09-29 RX ORDER — ERYTHROMYCIN 5 MG/G
OINTMENT OPHTHALMIC
Qty: 3.5 G | Refills: 0 | Status: SHIPPED | OUTPATIENT
Start: 2022-09-29

## 2022-09-29 RX ORDER — DEXAMETHASONE SODIUM PHOSPHATE 10 MG/ML
10 INJECTION INTRAMUSCULAR; INTRAVENOUS
Status: COMPLETED | OUTPATIENT
Start: 2022-09-29 | End: 2022-09-29

## 2022-09-29 RX ORDER — TETRACAINE HYDROCHLORIDE 5 MG/ML
1 SOLUTION OPHTHALMIC
Status: DISCONTINUED | OUTPATIENT
Start: 2022-09-29 | End: 2022-09-29 | Stop reason: HOSPADM

## 2022-09-29 RX ORDER — CLINDAMYCIN HYDROCHLORIDE 150 MG/1
300 CAPSULE ORAL 3 TIMES DAILY
Qty: 30 CAPSULE | Refills: 0 | Status: SHIPPED | OUTPATIENT
Start: 2022-09-29 | End: 2022-10-04

## 2022-09-29 RX ADMIN — DEXAMETHASONE SODIUM PHOSPHATE 10 MG: 10 INJECTION INTRAMUSCULAR; INTRAVENOUS at 10:53

## 2022-09-29 ASSESSMENT — ENCOUNTER SYMPTOMS
CONSTIPATION: 0
SHORTNESS OF BREATH: 0
BLOOD IN STOOL: 0
APNEA: 0
SINUS PRESSURE: 0
RHINORRHEA: 0
VOICE CHANGE: 0
ABDOMINAL PAIN: 0
NAUSEA: 0
EYE REDNESS: 0
CHEST TIGHTNESS: 0
PHOTOPHOBIA: 0
VOMITING: 0
WHEEZING: 0
COUGH: 0
EYE DISCHARGE: 0
DIARRHEA: 0
COLOR CHANGE: 0

## 2022-09-29 ASSESSMENT — VISUAL ACUITY: OU: 1

## 2022-09-29 NOTE — ED TRIAGE NOTES
Pt started having some swelling around her right eye and a sore throat starting this morning. Pt took some allegra which relieved some of the swelling.

## 2022-09-29 NOTE — ED NOTES
I have reviewed discharge instructions with the patient and friend. The patient and friend verbalized understanding. Patient left ED via Discharge Method: ambulatory to Home with self and friend. Opportunity for questions and clarification provided. Patient given 2 scripts. To continue your aftercare when you leave the hospital, you may receive an automated call from our care team to check in on how you are doing. This is a free service and part of our promise to provide the best care and service to meet your aftercare needs.  If you have questions, or wish to unsubscribe from this service please call [phone]. Thank you for Choosing our [employer] Emergency Department.         Caryn Capone RN  09/29/22 [id number]

## 2022-09-29 NOTE — ED PROVIDER NOTES
Emergency Department Provider Note                   PCP:                Faisal Dejesus DO               Age: 65 y.o. Sex: female       ICD-10-CM    1. Pain and swelling of upper eyelid of right eye  H02.89     H02.841           DISPOSITION Discharge - Pending Orders Complete 09/29/2022 10:42:15 AM        MDM  Number of Diagnoses or Management Options  Pain and swelling of upper eyelid of right eye  Diagnosis management comments: 10:40 AM  Ddx includes Conjunctivitis - allergic, viral, bacterial, corneal abrasion, dacrocystitis, blepharitis, allergic reaction    10:51 AM  In summary this is a 27-year-old female patient presenting with right medial upper eyelid swelling. Based on my evaluation I feel she is at low risk for more serious causes of diagnoses such as acute visual loss, orbital cellulitis, corneal abrasion. Reasoning behind decision making process is that the patient is grossly well-appearing on exam in no acute distress. She has very minimal swelling noted. Her visual acuity is within normal limits. She does not wear contacts. Her fluorescein exam is negative without Lorelei sign or fluorescein uptake. She has no photosensitivity or conjunctival injection or perilimbic flush. No associated headaches or fevers. No pain with EOMs, proptosis or signs of orbital cellulitis. She also did reported dry throat today however reported that it was worse in the morning and has since been getting better. I suspect this is due in part to the cooling weather and sleeping with her mouth open. Her pharyngeal exam was within normal limits today. No signs of peritonsillar abscess or throat swelling or respiratory distress. Plan for this patient is outpatient management. Steroids given for swelling. Advised her to continue use of antihistamine such as Allegra for itching and swelling.   We will start patient on antibiotic eyedrops and short course of oral antibiotics to prevent any preseptal cellulitis. I specifically counseled the patient on warning signs to return immediately for including but not limited to visual changes, severe pain, fever, pain and moving her eye, proptosis. Patient verbalized understanding and is in agreement with the treatment plan. Patient was discharged in stable condition. Amount and/or Complexity of Data Reviewed  Clinical lab tests: reviewed and ordered    Risk of Complications, Morbidity, and/or Mortality  Presenting problems: low  Diagnostic procedures: low  Management options: low    Patient Progress  Patient progress: stable             Orders Placed This Encounter   Procedures    Visual acuity screening        Medications   tetracaine (TETRAVISC) 0.5 % ophthalmic solution 1 drop (has no administration in time range)   fluorescein ophthalmic strip 1 mg (has no administration in time range)   dexamethasone (DECADRON) injection 10 mg (10 mg IntraMUSCular Given 9/29/22 1053)       New Prescriptions    CLINDAMYCIN (CLEOCIN) 150 MG CAPSULE    Take 2 capsules by mouth 3 times daily for 5 days    ERYTHROMYCIN (ROMYCIN) 5 MG/GM OPHTHALMIC OINTMENT    Apply to affected eye 4 times daily for 7 days        Rhonda Cash is a 65 y.o. female who presents to the Emergency Department with chief complaint of    Chief Complaint   Patient presents with    Facial Swelling    Pharyngitis      59-year-old female patient with history of hypertension presents today complaining of medial upper right eyelid swelling that she woke up with this morning. Reports symptoms are constant and mild in severity. States she took some Allegra which did help. Says it does not hurt but just feels \"irritated. \"  Associated symptoms include tearing and a dry throat and she also woke up with this morning that has improved as the day is gone on. No other symptoms today.   Denies fever, chills, trismus, drooling, voice changes, neck stiffness or swelling, difficulty swallowing, difficulty breathing. Denies decreases in vision, double vision, blurry vision. Denies photosensitivity, pain with EOMs, proptosis, conjunctival injection, ocular discharge, headaches. Denies all other symptoms today. Patient is primary historian and quality is reliable. The history is provided by the patient. No  was used. Review of Systems   Constitutional:  Negative for appetite change, chills, fatigue, fever and unexpected weight change. HENT:  Negative for congestion, ear pain, hearing loss, postnasal drip, rhinorrhea, sinus pressure and voice change. Eyes:  Negative for photophobia, discharge, redness and visual disturbance. Respiratory:  Negative for apnea, cough, chest tightness, shortness of breath and wheezing. Cardiovascular:  Negative for chest pain, palpitations and leg swelling. Gastrointestinal:  Negative for abdominal pain, blood in stool, constipation, diarrhea, nausea and vomiting. Endocrine: Negative for polydipsia, polyphagia and polyuria. Genitourinary:  Negative for decreased urine volume, dysuria, flank pain, frequency and hematuria. Musculoskeletal:  Negative for arthralgias, joint swelling, myalgias and neck stiffness. Skin:  Negative for color change, rash and wound. Neurological:  Negative for dizziness, syncope, speech difficulty, weakness, light-headedness and headaches. Hematological:  Negative for adenopathy. Does not bruise/bleed easily. Psychiatric/Behavioral:  Negative for confusion, self-injury, sleep disturbance and suicidal ideas. All other systems reviewed and are negative.     Past Medical History:   Diagnosis Date    Cancer [facility])     rectal carcinoid    Hypertension     managed with meds    Menopause         Past Surgical History:   Procedure Laterality Date    COLONOSCOPY N/A 6/4/2019    COLONOSCOPY performed by Lacy Goff MD at [address] [facility] [place] HISTORY      2014        Family History Problem Relation Age of Onset    Other Father         Cardiac Arrest    Heart Disease Father     Other Mother         Heart Problems    Heart Disease Mother     Heart Disease Brother     Heart Disease Brother     Heart Disease Brother     Heart Disease Brother     Breast Cancer Neg Hx         Social History     Socioeconomic History    Marital status:      Spouse name: None    Number of children: None    Years of education: None    Highest education level: None   Tobacco Use    Smoking status: Never     Passive exposure: Never    Smokeless tobacco: Never   Vaping Use    Vaping Use: Never used   Substance and Sexual Activity    Alcohol use: No    Drug use: Never         Aspirin     Previous Medications    LOSARTAN-HYDROCHLOROTHIAZIDE (HYZAAR) 50-12.5 MG PER TABLET    Take 1 tablet by mouth daily TAKE 1 TABLET DAILY FOR HYPERTENSION    NAPROXEN (NAPROSYN) 500 MG TABLET    Take 1 tablet by mouth 2 times daily as needed for Pain    VERAPAMIL (VERELAN) 240 MG EXTENDED RELEASE CAPSULE    Take 1 capsule by mouth nightly        Vitals signs and nursing note reviewed. Patient Vitals for the past 4 hrs:   Temp Pulse Resp BP SpO2   09/29/22 1006 98.3 °F (36.8 °C) 85 18 (!) 158/101 96 %          Physical Exam  Vitals and nursing note reviewed. Constitutional:       General: She is not in acute distress. Appearance: Normal appearance. She is obese. She is not ill-appearing, toxic-appearing or diaphoretic. Comments: Pleasant and cooperative. No acute distress. Resting comfortably. HENT:      Head: Normocephalic and atraumatic. Right Ear: Tympanic membrane, ear canal and external ear normal. No drainage, swelling or tenderness. No middle ear effusion. Tympanic membrane is not erythematous. Left Ear: Tympanic membrane, ear canal and external ear normal. No drainage, swelling or tenderness. No middle ear effusion. Tympanic membrane is not erythematous.       Nose: Nose normal. No congestion or rhinorrhea. Mouth/Throat:      Mouth: Mucous membranes are moist. No oral lesions. Pharynx: Oropharynx is clear. Uvula midline. No pharyngeal swelling, oropharyngeal exudate, posterior oropharyngeal erythema or uvula swelling. Tonsils: No tonsillar exudate or tonsillar abscesses. 0 on the right. 0 on the left. Eyes:      General: Lids are everted, no foreign bodies appreciated. Vision grossly intact. Gaze aligned appropriately. No allergic shiner, visual field deficit or scleral icterus. Right eye: No foreign body, discharge or hordeolum. Left eye: No foreign body, discharge or hordeolum. Extraocular Movements:      Right eye: Normal extraocular motion and no nystagmus. Left eye: Normal extraocular motion and no nystagmus. Conjunctiva/sclera: Conjunctivae normal.      Right eye: Right conjunctiva is not injected. No chemosis, exudate or hemorrhage. Left eye: Left conjunctiva is not injected. No chemosis, exudate or hemorrhage. Pupils: Pupils are equal, round, and reactive to light. Right eye: No corneal abrasion or fluorescein uptake. Lorelei exam negative. Left eye: No corneal abrasion or fluorescein uptake. Lorelei exam negative. Funduscopic exam:     Right eye: No papilledema. Red reflex present. Left eye: No papilledema. Red reflex present. Comments: Minimal superior medial right eyelid swelling and area circled. Funduscopic exam within normal limits though difficult without dilated eye. Full EOMs without pain or entrapment. No proptosis or enophthalmos. Fluorescein exam grossly within normal limits without dye uptake. Negative Lorelei sign. Visual acuity baseline normal for patient. No discharge noted. No erythema noted. Cardiovascular:      Rate and Rhythm: Normal rate and regular rhythm. Pulses: Normal pulses. Heart sounds: Normal heart sounds.    Pulmonary:      Effort: Pulmonary effort is normal. No respiratory distress. Breath sounds: Normal breath sounds. No stridor. No wheezing, rhonchi or rales. Abdominal:      General: Abdomen is flat. Bowel sounds are normal.      Palpations: Abdomen is soft. Tenderness: There is no abdominal tenderness. There is no guarding or rebound. Musculoskeletal:         General: Normal range of motion. Cervical back: Normal range of motion and neck supple. No rigidity or tenderness. Right lower leg: No edema. Left lower leg: No edema. Lymphadenopathy:      Cervical: No cervical adenopathy. Skin:     General: Skin is warm and dry. Capillary Refill: Capillary refill takes less than 2 seconds. Coloration: Skin is not jaundiced. Neurological:      General: No focal deficit present. Mental Status: She is alert and oriented to person, place, and time. Mental status is at baseline. Psychiatric:         Mood and Affect: Mood normal.         Behavior: Behavior normal.         Thought Content: Thought content normal.         Judgment: Judgment normal.        Procedures    No results found for any visits on 09/29/22. No orders to display                       Voice dictation software was used during the making of this note. This software is not perfect and grammatical and other typographical errors may be present. This note has not been completely proofread for errors.      Sandeep Beasley [place]  09/29/22 2535

## 2023-10-31 ENCOUNTER — TELEPHONE (OUTPATIENT)
Dept: FAMILY MEDICINE CLINIC | Facility: CLINIC | Age: 63
End: 2023-10-31

## 2023-11-22 ENCOUNTER — TELEPHONE (OUTPATIENT)
Dept: FAMILY MEDICINE CLINIC | Facility: CLINIC | Age: 63
End: 2023-11-22

## 2023-11-22 NOTE — TELEPHONE ENCOUNTER
Called patient no answer left a message to call office to schedule an appointment in the next few weeks with Dr Shade Alpers

## 2023-12-01 DIAGNOSIS — I10 ESSENTIAL HYPERTENSION: ICD-10-CM

## 2023-12-01 RX ORDER — LOSARTAN POTASSIUM AND HYDROCHLOROTHIAZIDE 12.5; 5 MG/1; MG/1
1 TABLET ORAL DAILY
Qty: 90 TABLET | Refills: 0 | Status: CANCELLED | OUTPATIENT
Start: 2023-12-01

## 2023-12-01 RX ORDER — VERAPAMIL HYDROCHLORIDE 240 MG/1
240 CAPSULE, EXTENDED RELEASE ORAL NIGHTLY
Qty: 90 CAPSULE | Refills: 0 | Status: CANCELLED | OUTPATIENT
Start: 2023-12-01

## 2024-01-19 DIAGNOSIS — I10 ESSENTIAL HYPERTENSION: ICD-10-CM

## 2024-01-19 RX ORDER — LOSARTAN POTASSIUM AND HYDROCHLOROTHIAZIDE 12.5; 5 MG/1; MG/1
1 TABLET ORAL DAILY
Qty: 90 TABLET | Refills: 3 | OUTPATIENT
Start: 2024-01-19

## 2024-01-19 RX ORDER — VERAPAMIL HYDROCHLORIDE 240 MG/1
240 CAPSULE, EXTENDED RELEASE ORAL NIGHTLY
Qty: 90 CAPSULE | Refills: 3 | OUTPATIENT
Start: 2024-01-19

## 2024-01-24 DIAGNOSIS — I10 ESSENTIAL HYPERTENSION: ICD-10-CM

## 2024-01-25 RX ORDER — LOSARTAN POTASSIUM AND HYDROCHLOROTHIAZIDE 12.5; 5 MG/1; MG/1
1 TABLET ORAL DAILY
Qty: 60 TABLET | Refills: 1 | Status: SHIPPED | OUTPATIENT
Start: 2024-01-25

## 2024-01-25 RX ORDER — VERAPAMIL HYDROCHLORIDE 240 MG/1
240 CAPSULE, EXTENDED RELEASE ORAL NIGHTLY
Qty: 60 CAPSULE | Refills: 1 | Status: SHIPPED | OUTPATIENT
Start: 2024-01-25

## 2024-05-16 DIAGNOSIS — I10 ESSENTIAL HYPERTENSION: ICD-10-CM

## 2024-05-17 RX ORDER — LOSARTAN POTASSIUM AND HYDROCHLOROTHIAZIDE 12.5; 5 MG/1; MG/1
1 TABLET ORAL DAILY
Qty: 60 TABLET | Refills: 1 | OUTPATIENT
Start: 2024-05-17

## 2024-06-18 DIAGNOSIS — I10 ESSENTIAL HYPERTENSION: ICD-10-CM

## 2024-06-18 RX ORDER — LOSARTAN POTASSIUM AND HYDROCHLOROTHIAZIDE 12.5; 5 MG/1; MG/1
1 TABLET ORAL DAILY
Qty: 60 TABLET | Refills: 1 | OUTPATIENT
Start: 2024-06-18

## 2024-06-18 RX ORDER — VERAPAMIL HYDROCHLORIDE 240 MG/1
240 CAPSULE, EXTENDED RELEASE ORAL NIGHTLY
Qty: 60 CAPSULE | Refills: 1 | OUTPATIENT
Start: 2024-06-18

## (undated) DEVICE — REM POLYHESIVE ADULT PATIENT RETURN ELECTRODE: Brand: VALLEYLAB

## (undated) DEVICE — SOLUTION IV 1000ML 0.9% SOD CHL

## (undated) DEVICE — JELLY LUBRICATING 10GM PREFIL SYR LUBE

## (undated) DEVICE — FORCEPS BX L240CM JAW DIA2.8MM L CAP W/ NDL MIC MESH TOOTH

## (undated) DEVICE — SUTURE CHROMIC GUT SZ 3-0 L27IN ABSRB BRN L17MM RB-1 1/2 U204H

## (undated) DEVICE — GARMENT,MEDLINE,DVT,INT,CALF,MED, GEN2: Brand: MEDLINE

## (undated) DEVICE — NDL HYPO BVL NSAF 25GX1IN LF --

## (undated) DEVICE — TELFA NON-ADHERENT ABSORBENT DRESSING: Brand: TELFA

## (undated) DEVICE — SYR 5ML 1/5 GRAD LL NSAF LF --

## (undated) DEVICE — SYR 10ML LUER LOK 1/5ML GRAD --

## (undated) DEVICE — CONNECTOR TBNG OD5-7MM O2 END DISP

## (undated) DEVICE — SURGICAL PROCEDURE PACK BASIC ST FRANCIS

## (undated) DEVICE — TRAY PREP DRY W/ PREM GLV 2 APPL 6 SPNG 2 UNDPD 1 OVERWRAP

## (undated) DEVICE — KENDALL RADIOLUCENT FOAM MONITORING ELECTRODE RECTANGULAR SHAPE: Brand: KENDALL

## (undated) DEVICE — AMD ANTIMICROBIAL GAUZE SPONGES,12 PLY USP TYPE VII, 0.2% POLYHEXAMETHYLENE BIGUANIDE HCI (PHMB): Brand: CURITY

## (undated) DEVICE — SHEET, T, LAPAROTOMY, STERILE: Brand: MEDLINE

## (undated) DEVICE — NDL PRT INJ NSAF BLNT 18GX1.5 --

## (undated) DEVICE — PAD,ABDOMINAL,5"X9",ST,LF,25/BX: Brand: MEDLINE INDUSTRIES, INC.

## (undated) DEVICE — CONTAINER PREFIL FRMLN 40ML --

## (undated) DEVICE — CANNULA NSL ORAL AD FOR CAPNOFLEX CO2 O2 AIRLFE

## (undated) DEVICE — CURVED, SMALL JAW, OPEN SEALER/DIVIDER: Brand: LIGASURE

## (undated) DEVICE — BUTTON SWITCH PENCIL BLADE ELECTRODE, HOLSTER: Brand: EDGE

## (undated) DEVICE — SYR 3ML LL TIP 1/10ML GRAD --